# Patient Record
Sex: FEMALE | URBAN - METROPOLITAN AREA
[De-identification: names, ages, dates, MRNs, and addresses within clinical notes are randomized per-mention and may not be internally consistent; named-entity substitution may affect disease eponyms.]

---

## 2020-04-05 ENCOUNTER — INPATIENT (INPATIENT)
Facility: HOSPITAL | Age: 75
LOS: 7 days | Discharge: ANOTHER HOSPITAL | DRG: 177 | End: 2020-04-13
Payer: MEDICARE

## 2020-04-05 ENCOUNTER — TRANSCRIPTION ENCOUNTER (OUTPATIENT)
Age: 75
End: 2020-04-05

## 2020-04-05 VITALS
RESPIRATION RATE: 16 BRPM | HEIGHT: 62 IN | SYSTOLIC BLOOD PRESSURE: 120 MMHG | OXYGEN SATURATION: 72 % | TEMPERATURE: 98 F | HEART RATE: 79 BPM | DIASTOLIC BLOOD PRESSURE: 67 MMHG | WEIGHT: 117.95 LBS

## 2020-04-05 DIAGNOSIS — F41.9 ANXIETY DISORDER, UNSPECIFIED: ICD-10-CM

## 2020-04-05 DIAGNOSIS — J45.909 UNSPECIFIED ASTHMA, UNCOMPLICATED: ICD-10-CM

## 2020-04-05 DIAGNOSIS — I10 ESSENTIAL (PRIMARY) HYPERTENSION: ICD-10-CM

## 2020-04-05 DIAGNOSIS — Z29.9 ENCOUNTER FOR PROPHYLACTIC MEASURES, UNSPECIFIED: ICD-10-CM

## 2020-04-05 DIAGNOSIS — U07.1 COVID-19: ICD-10-CM

## 2020-04-05 DIAGNOSIS — E11.9 TYPE 2 DIABETES MELLITUS WITHOUT COMPLICATIONS: ICD-10-CM

## 2020-04-05 DIAGNOSIS — E78.00 PURE HYPERCHOLESTEROLEMIA, UNSPECIFIED: ICD-10-CM

## 2020-04-05 LAB
ALBUMIN SERPL ELPH-MCNC: 3.5 G/DL — SIGNIFICANT CHANGE UP (ref 3.3–5)
ALP SERPL-CCNC: 82 U/L — SIGNIFICANT CHANGE UP (ref 40–120)
ALT FLD-CCNC: 46 U/L — HIGH (ref 10–45)
ANION GAP SERPL CALC-SCNC: 16 MMOL/L — SIGNIFICANT CHANGE UP (ref 5–17)
APPEARANCE UR: CLEAR — SIGNIFICANT CHANGE UP
APTT BLD: 31.6 SEC — SIGNIFICANT CHANGE UP (ref 27.5–36.3)
AST SERPL-CCNC: 45 U/L — HIGH (ref 10–40)
BASE EXCESS BLDV CALC-SCNC: 1.2 MMOL/L — SIGNIFICANT CHANGE UP
BASOPHILS # BLD AUTO: 0.02 K/UL — SIGNIFICANT CHANGE UP (ref 0–0.2)
BASOPHILS NFR BLD AUTO: 0.1 % — SIGNIFICANT CHANGE UP (ref 0–2)
BILIRUB SERPL-MCNC: 0.7 MG/DL — SIGNIFICANT CHANGE UP (ref 0.2–1.2)
BILIRUB UR-MCNC: NEGATIVE — SIGNIFICANT CHANGE UP
BUN SERPL-MCNC: 15 MG/DL — SIGNIFICANT CHANGE UP (ref 7–23)
CALCIUM SERPL-MCNC: 9.1 MG/DL — SIGNIFICANT CHANGE UP (ref 8.4–10.5)
CHLORIDE SERPL-SCNC: 98 MMOL/L — SIGNIFICANT CHANGE UP (ref 96–108)
CK SERPL-CCNC: 38 U/L — SIGNIFICANT CHANGE UP (ref 25–170)
CO2 SERPL-SCNC: 22 MMOL/L — SIGNIFICANT CHANGE UP (ref 22–31)
COLOR SPEC: YELLOW — SIGNIFICANT CHANGE UP
CREAT SERPL-MCNC: 0.61 MG/DL — SIGNIFICANT CHANGE UP (ref 0.5–1.3)
CRP SERPL-MCNC: 11.19 MG/DL — HIGH (ref 0–0.4)
D DIMER BLD IA.RAPID-MCNC: 343 NG/ML DDU — HIGH
DIFF PNL FLD: NEGATIVE — SIGNIFICANT CHANGE UP
EOSINOPHIL # BLD AUTO: 0 K/UL — SIGNIFICANT CHANGE UP (ref 0–0.5)
EOSINOPHIL NFR BLD AUTO: 0 % — SIGNIFICANT CHANGE UP (ref 0–6)
ERYTHROCYTE [SEDIMENTATION RATE] IN BLOOD: 65 MM/HR — HIGH
FERRITIN SERPL-MCNC: 781 NG/ML — HIGH (ref 15–150)
GLUCOSE BLDC GLUCOMTR-MCNC: 118 MG/DL — HIGH (ref 70–99)
GLUCOSE SERPL-MCNC: 135 MG/DL — HIGH (ref 70–99)
GLUCOSE UR QL: NEGATIVE — SIGNIFICANT CHANGE UP
HCO3 BLDV-SCNC: 25 MMOL/L — SIGNIFICANT CHANGE UP (ref 20–27)
HCT VFR BLD CALC: 41.1 % — SIGNIFICANT CHANGE UP (ref 34.5–45)
HGB BLD-MCNC: 13.5 G/DL — SIGNIFICANT CHANGE UP (ref 11.5–15.5)
IMM GRANULOCYTES NFR BLD AUTO: 1.2 % — SIGNIFICANT CHANGE UP (ref 0–1.5)
INR BLD: 1.08 — SIGNIFICANT CHANGE UP (ref 0.88–1.16)
KETONES UR-MCNC: 15 MG/DL
LACTATE SERPL-SCNC: 1.8 MMOL/L — SIGNIFICANT CHANGE UP (ref 0.5–2)
LDH SERPL L TO P-CCNC: 516 U/L — HIGH (ref 50–242)
LEUKOCYTE ESTERASE UR-ACNC: ABNORMAL
LYMPHOCYTES # BLD AUTO: 0.76 K/UL — LOW (ref 1–3.3)
LYMPHOCYTES # BLD AUTO: 5.3 % — LOW (ref 13–44)
MCHC RBC-ENTMCNC: 28.1 PG — SIGNIFICANT CHANGE UP (ref 27–34)
MCHC RBC-ENTMCNC: 32.8 GM/DL — SIGNIFICANT CHANGE UP (ref 32–36)
MCV RBC AUTO: 85.6 FL — SIGNIFICANT CHANGE UP (ref 80–100)
MONOCYTES # BLD AUTO: 0.56 K/UL — SIGNIFICANT CHANGE UP (ref 0–0.9)
MONOCYTES NFR BLD AUTO: 3.9 % — SIGNIFICANT CHANGE UP (ref 2–14)
NEUTROPHILS # BLD AUTO: 12.79 K/UL — HIGH (ref 1.8–7.4)
NEUTROPHILS NFR BLD AUTO: 89.5 % — HIGH (ref 43–77)
NITRITE UR-MCNC: NEGATIVE — SIGNIFICANT CHANGE UP
NRBC # BLD: 0 /100 WBCS — SIGNIFICANT CHANGE UP (ref 0–0)
PCO2 BLDV: 38 MMHG — LOW (ref 41–51)
PH BLDV: 7.44 — HIGH (ref 7.32–7.43)
PH UR: 6 — SIGNIFICANT CHANGE UP (ref 5–8)
PLATELET # BLD AUTO: 380 K/UL — SIGNIFICANT CHANGE UP (ref 150–400)
PO2 BLDV: 60 MMHG — SIGNIFICANT CHANGE UP
POTASSIUM SERPL-MCNC: 4.2 MMOL/L — SIGNIFICANT CHANGE UP (ref 3.5–5.3)
POTASSIUM SERPL-SCNC: 4.2 MMOL/L — SIGNIFICANT CHANGE UP (ref 3.5–5.3)
PROT SERPL-MCNC: 7.7 G/DL — SIGNIFICANT CHANGE UP (ref 6–8.3)
PROT UR-MCNC: 30 MG/DL
PROTHROM AB SERPL-ACNC: 12.4 SEC — SIGNIFICANT CHANGE UP (ref 10–12.9)
RBC # BLD: 4.8 M/UL — SIGNIFICANT CHANGE UP (ref 3.8–5.2)
RBC # FLD: 13.3 % — SIGNIFICANT CHANGE UP (ref 10.3–14.5)
SAO2 % BLDV: 91 % — SIGNIFICANT CHANGE UP
SARS-COV-2 RNA SPEC QL NAA+PROBE: DETECTED
SODIUM SERPL-SCNC: 136 MMOL/L — SIGNIFICANT CHANGE UP (ref 135–145)
SP GR SPEC: 1.02 — SIGNIFICANT CHANGE UP (ref 1–1.03)
TROPONIN T SERPL-MCNC: <0.01 NG/ML — SIGNIFICANT CHANGE UP (ref 0–0.01)
UROBILINOGEN FLD QL: 2 E.U./DL
WBC # BLD: 14.3 K/UL — HIGH (ref 3.8–10.5)
WBC # FLD AUTO: 14.3 K/UL — HIGH (ref 3.8–10.5)

## 2020-04-05 PROCEDURE — 99223 1ST HOSP IP/OBS HIGH 75: CPT | Mod: GC

## 2020-04-05 PROCEDURE — 71045 X-RAY EXAM CHEST 1 VIEW: CPT | Mod: 26

## 2020-04-05 PROCEDURE — 99285 EMERGENCY DEPT VISIT HI MDM: CPT

## 2020-04-05 PROCEDURE — 93010 ELECTROCARDIOGRAM REPORT: CPT

## 2020-04-05 RX ORDER — DEXTROSE 50 % IN WATER 50 %
15 SYRINGE (ML) INTRAVENOUS ONCE
Refills: 0 | Status: DISCONTINUED | OUTPATIENT
Start: 2020-04-05 | End: 2020-04-13

## 2020-04-05 RX ORDER — SERTRALINE 25 MG/1
25 TABLET, FILM COATED ORAL DAILY
Refills: 0 | Status: DISCONTINUED | OUTPATIENT
Start: 2020-04-05 | End: 2020-04-13

## 2020-04-05 RX ORDER — ALBUTEROL 90 UG/1
2 AEROSOL, METERED ORAL EVERY 6 HOURS
Refills: 0 | Status: DISCONTINUED | OUTPATIENT
Start: 2020-04-05 | End: 2020-04-13

## 2020-04-05 RX ORDER — AZITHROMYCIN 500 MG/1
250 TABLET, FILM COATED ORAL EVERY 24 HOURS
Refills: 0 | Status: COMPLETED | OUTPATIENT
Start: 2020-04-06 | End: 2020-04-09

## 2020-04-05 RX ORDER — DEXTROSE 50 % IN WATER 50 %
12.5 SYRINGE (ML) INTRAVENOUS ONCE
Refills: 0 | Status: DISCONTINUED | OUTPATIENT
Start: 2020-04-05 | End: 2020-04-13

## 2020-04-05 RX ORDER — ENOXAPARIN SODIUM 100 MG/ML
40 INJECTION SUBCUTANEOUS EVERY 24 HOURS
Refills: 0 | Status: DISCONTINUED | OUTPATIENT
Start: 2020-04-05 | End: 2020-04-13

## 2020-04-05 RX ORDER — HYDROXYCHLOROQUINE SULFATE 200 MG
400 TABLET ORAL EVERY 12 HOURS
Refills: 0 | Status: COMPLETED | OUTPATIENT
Start: 2020-04-05 | End: 2020-04-06

## 2020-04-05 RX ORDER — DEXTROSE 50 % IN WATER 50 %
25 SYRINGE (ML) INTRAVENOUS ONCE
Refills: 0 | Status: DISCONTINUED | OUTPATIENT
Start: 2020-04-05 | End: 2020-04-13

## 2020-04-05 RX ORDER — ALPRAZOLAM 0.25 MG
0.5 TABLET ORAL AT BEDTIME
Refills: 0 | Status: DISCONTINUED | OUTPATIENT
Start: 2020-04-05 | End: 2020-04-09

## 2020-04-05 RX ORDER — HYDROXYCHLOROQUINE SULFATE 200 MG
TABLET ORAL
Refills: 0 | Status: COMPLETED | OUTPATIENT
Start: 2020-04-05 | End: 2020-04-10

## 2020-04-05 RX ORDER — SODIUM CHLORIDE 9 MG/ML
1000 INJECTION, SOLUTION INTRAVENOUS
Refills: 0 | Status: DISCONTINUED | OUTPATIENT
Start: 2020-04-05 | End: 2020-04-13

## 2020-04-05 RX ORDER — ACETAMINOPHEN 500 MG
650 TABLET ORAL ONCE
Refills: 0 | Status: COMPLETED | OUTPATIENT
Start: 2020-04-05 | End: 2020-04-05

## 2020-04-05 RX ORDER — HYDROXYCHLOROQUINE SULFATE 200 MG
200 TABLET ORAL EVERY 12 HOURS
Refills: 0 | Status: COMPLETED | OUTPATIENT
Start: 2020-04-06 | End: 2020-04-10

## 2020-04-05 RX ORDER — GLUCAGON INJECTION, SOLUTION 0.5 MG/.1ML
1 INJECTION, SOLUTION SUBCUTANEOUS ONCE
Refills: 0 | Status: DISCONTINUED | OUTPATIENT
Start: 2020-04-05 | End: 2020-04-13

## 2020-04-05 RX ORDER — FAMOTIDINE 10 MG/ML
20 INJECTION INTRAVENOUS DAILY
Refills: 0 | Status: DISCONTINUED | OUTPATIENT
Start: 2020-04-05 | End: 2020-04-13

## 2020-04-05 RX ORDER — AZITHROMYCIN 500 MG/1
500 TABLET, FILM COATED ORAL ONCE
Refills: 0 | Status: COMPLETED | OUTPATIENT
Start: 2020-04-05 | End: 2020-04-05

## 2020-04-05 RX ORDER — INSULIN LISPRO 100/ML
VIAL (ML) SUBCUTANEOUS
Refills: 0 | Status: DISCONTINUED | OUTPATIENT
Start: 2020-04-05 | End: 2020-04-13

## 2020-04-05 RX ORDER — LANOLIN ALCOHOL/MO/W.PET/CERES
3 CREAM (GRAM) TOPICAL AT BEDTIME
Refills: 0 | Status: DISCONTINUED | OUTPATIENT
Start: 2020-04-05 | End: 2020-04-13

## 2020-04-05 RX ORDER — BUDESONIDE AND FORMOTEROL FUMARATE DIHYDRATE 160; 4.5 UG/1; UG/1
2 AEROSOL RESPIRATORY (INHALATION)
Refills: 0 | Status: DISCONTINUED | OUTPATIENT
Start: 2020-04-05 | End: 2020-04-13

## 2020-04-05 RX ORDER — ACETAMINOPHEN 500 MG
650 TABLET ORAL EVERY 6 HOURS
Refills: 0 | Status: DISCONTINUED | OUTPATIENT
Start: 2020-04-05 | End: 2020-04-13

## 2020-04-05 RX ORDER — GUAIFENESIN/DEXTROMETHORPHAN 600MG-30MG
5 TABLET, EXTENDED RELEASE 12 HR ORAL EVERY 8 HOURS
Refills: 0 | Status: DISCONTINUED | OUTPATIENT
Start: 2020-04-05 | End: 2020-04-13

## 2020-04-05 RX ADMIN — Medication 0.5 MILLIGRAM(S): at 21:18

## 2020-04-05 RX ADMIN — AZITHROMYCIN 255 MILLIGRAM(S): 500 TABLET, FILM COATED ORAL at 15:17

## 2020-04-05 RX ADMIN — ENOXAPARIN SODIUM 40 MILLIGRAM(S): 100 INJECTION SUBCUTANEOUS at 21:18

## 2020-04-05 RX ADMIN — FAMOTIDINE 20 MILLIGRAM(S): 10 INJECTION INTRAVENOUS at 21:18

## 2020-04-05 RX ADMIN — Medication 400 MILLIGRAM(S): at 18:28

## 2020-04-05 RX ADMIN — Medication 650 MILLIGRAM(S): at 13:45

## 2020-04-05 NOTE — ED PROVIDER NOTE - PROGRESS NOTE DETAILS
Klepfish: Well appearing, resting in bed, appears comfortable. satting 95% on 4.5L NC. Klepfish: wbc 14, elevated inflammatory markers, other labs grossly wnl. CXR b/l infiltrates. pt stable on NC, comfortable appearing. satting mid to high 90s on 4.5L NC. Clinically does not require higher level of care at this time. will admit for further care.

## 2020-04-05 NOTE — H&P ADULT - ASSESSMENT
74F former smoker PMH asthma (never intubated), HTN, HLD, DM p/w SOB. Pt has 5d of cough, fatigue, f/c, decreased appetite, worsening SOB. Went to UC who referred to ED for hypoxia. No known sick contacts. Denies HA, CP, NVD, abd pain, urinary complaints, recent travel, rashes, black/bloody stool. 74 F former smoker, PMH asthma (never intubated, ran out of albuterol), HTN, HLD, DM p/w 5 days SOB/ cough, admitted for hypoxia 2/2 covid.

## 2020-04-05 NOTE — H&P ADULT - PROBLEM SELECTOR PLAN 2
never intubated, mild intermittent but also on montelukast  -albuterol q6 standing  -symbicort  bid  -steroids if worsens  -consider restarting monteluklast

## 2020-04-05 NOTE — ED PROVIDER NOTE - OBJECTIVE STATEMENT
74F former smoker PMH asthma (never intubated), HTN, HLD, DM p/w SOB. Pt has 5d of cough, fatigue, f/c, decreased appetite, worsening SOB. Went to UC who referred to ED for hypoxia. No known sick contacts. Denies HA, CP, NVD, abd pain, urinary complaints, recent travel, rashes, black/bloody stool.

## 2020-04-05 NOTE — ED PROVIDER NOTE - CLINICAL SUMMARY MEDICAL DECISION MAKING FREE TEXT BOX
74F PMH asthma (never intubated), HTN, HLD, DM p/w SOB. Pt has 5d of cough, fatigue, f/c, decreased appetite, worsening SOB. Went to  who referred to ED for hypoxia. No other systemic symptoms. Hypoxic w/ good waveform to 75RA at rest, mild tachypnea, other vitals wnl. Exam as above.  ddx: Likely covid.   covid labs w/u, CXR, tylenol.  Placed on NC, satting 91% on 4L.

## 2020-04-05 NOTE — H&P ADULT - NSHPPHYSICALEXAM_GEN_ALL_CORE
.  VITAL SIGNS:  T(C): 36.6 (04-05-20 @ 13:31), Max: 36.6 (04-05-20 @ 13:31)  T(F): 97.8 (04-05-20 @ 13:31), Max: 97.8 (04-05-20 @ 13:31)  HR: 79 (04-05-20 @ 13:31) (79 - 79)  BP: 120/67 (04-05-20 @ 13:31) (120/67 - 120/67)  BP(mean): --  RR: 16 (04-05-20 @ 13:31) (16 - 16)  SpO2: 95% (04-05-20 @ 14:32) (72% - 95%)  Wt(kg): --    PHYSICAL EXAM:    Constitutional: WDWN resting comfortably in bed; NAD  Head: NC/AT  Eyes: PERRL, EOMI, clear conjunctiva  ENT: no nasal discharge; uvula midline, no oropharyngeal erythema or exudates; MMM  Neck: supple; no JVD or thyromegaly  Respiratory: CTA B/L; no W/R/R, no retractions  Cardiac: +S1/S2; RRR; no M/R/G;  Gastrointestinal: soft, NT/ND; no rebound or guarding; +BSx4  Extremities: WWP, no clubbing or cyanosis; no peripheral edema  Musculoskeletal: NROM x4; no joint swelling, tenderness or erythema  Vascular: 2+ radial, femoral, DP/PT pulses B/L  Dermatologic: skin warm, dry and intact; no rashes, wounds, or scars  Neurologic: AAOx3; CNII-XII grossly intact; no focal deficits  Psychiatric: affect and characteristics of appearance, verbalizations, behaviors are appropriate .  VITAL SIGNS:  T(C): 36.6 (04-05-20 @ 13:31), Max: 36.6 (04-05-20 @ 13:31)  T(F): 97.8 (04-05-20 @ 13:31), Max: 97.8 (04-05-20 @ 13:31)  HR: 79 (04-05-20 @ 13:31) (79 - 79)  BP: 120/67 (04-05-20 @ 13:31) (120/67 - 120/67)  BP(mean): --  RR: 16 (04-05-20 @ 13:31) (16 - 16)  SpO2: 95% (04-05-20 @ 14:32) (72% - 95%)  Wt(kg): --    PHYSICAL EXAM:    Constitutional: WDWN resting comfortably in bed; NAD  Head: NC/AT  Eyes: EOMI  ENT: on o2 nc  Respiratory: breathing comfortably on NC, no tachypnea, speaking full sentences  Gastrointestinal: soft, NT/ND  Extremities: WWP, no clubbing or cyanosis; no peripheral edema  Dermatologic: skin warm, dry and intact; no rashes, wounds, or scars  Neurologic: AAOx3;  Psychiatric: affect and characteristics of appearance, verbalizations, behaviors are appropriate

## 2020-04-05 NOTE — H&P ADULT - NSHPLABSRESULTS_GEN_ALL_CORE
.  LABS:                         13.5   14.30 )-----------( 380      ( 2020 14:23 )             41.1         136  |  98  |  15  ----------------------------<  135<H>  4.2   |  22  |  0.61    Ca    9.1      2020 14:23    TPro  7.7  /  Alb  3.5  /  TBili  0.7  /  DBili  x   /  AST  45<H>  /  ALT  46<H>  /  AlkPhos  82      PT/INR - ( 2020 14:23 )   PT: 12.4 sec;   INR: 1.08          PTT - ( 2020 14:23 )  PTT:31.6 sec  Urinalysis Basic - ( 2020 15:52 )    Color: Yellow / Appearance: Clear / S.020 / pH: x  Gluc: x / Ketone: 15 mg/dL  / Bili: Negative / Urobili: 2.0 E.U./dL   Blood: x / Protein: 30 mg/dL / Nitrite: NEGATIVE   Leuk Esterase: Moderate / RBC: < 5 /HPF / WBC 5-10 /HPF   Sq Epi: x / Non Sq Epi: 0-5 /HPF / Bacteria: Present /HPF      CARDIAC MARKERS ( 2020 14:23 )  x     / <0.01 ng/mL / 38 U/L / x     / x            Lactate, Blood: 1.8 mmol/L ( @ 14:23)      RADIOLOGY, EKG & ADDITIONAL TESTS: Reviewed.

## 2020-04-05 NOTE — H&P ADULT - PROBLEM SELECTOR PLAN 7
F: PO hydration  E: replete PRN  N: dash/ CC diet  DVT ppx: lovenox  other ppx famotidine  FULL CODE  dispo: RMF  Ambulate as tolerated

## 2020-04-05 NOTE — ED ADULT TRIAGE NOTE - CHIEF COMPLAINT QUOTE
Pt directed to ED from  [facility] SO[facility].  Daughter states "He Pulse Ox was low and they said she should come here."  Mask applied.

## 2020-04-05 NOTE — H&P ADULT - HISTORY OF PRESENT ILLNESS
74 F former smoker, PMH asthma (never intubated, ran out of albuterol), HTN, HLD, DM p/w 5 days of SOB. Pt w/ 5 days of SOB, cough, improved w/ albuterol but ran out of albuterol day before admission. No fevers, endorses chills, no nv/d. No sick contacts but daughter is nurse and pt went to [facility] in jan. Last asthma attack in dec, never got steroids or ER visit    HPI:    Date of onset of fever: n/a  Date of onset of dyspnea: 5 days ago  Recent Travel: [facility] january  Sick Contacts: no  COVID Exposure: no  Close Contacts: no    ROS:  Fevers: N  Malaise: N  Myalgias: N  SOB: Y        At rest: Y        With exertion: Y         At baseline: Y  Cough: Y        Productive:   Nausea: N  Vomiting:N  Diarrhea: N    PMHx:   HTN: Y  DM: Y  Lung Disease: N  Cardiovascular disease: N  Malignancy: N  Immunosuppression: N  HIV: N  CKD/ESRD: N  Chronic Liver Disease: N    SoHx:  Tobacco use:  former   Vape use: N  Health care worker: N- daughter is nurse 74 F former smoker, PMH asthma (never intubated, ran out of albuterol), HTN, HLD, DM p/w 5 days of SOB. Pt w/ 5 days of SOB, cough, improved w/ albuterol but ran out of albuterol day before admission. No fevers, endorses chills, no nv/d. No sick contacts but daughter is nurse and pt went to [facility] in jan. Last asthma attack in dec, never got steroids or ER visit, just montelukast.     HPI:    Date of onset of fever: n/a  Date of onset of dyspnea: 5 days ago  Recent Travel: [facility] january  Sick Contacts: no  COVID Exposure: no  Close Contacts: no    ROS:  Fevers: N  Malaise: N  Myalgias: N  SOB: Y        At rest: Y        With exertion: Y         At baseline: Y  Cough: Y        Productive:   Nausea: N  Vomiting:N  Diarrhea: N    PMHx:   HTN: Y  DM: Y  Lung Disease: N  Cardiovascular disease: N  Malignancy: N  Immunosuppression: N  HIV: N  CKD/ESRD: N  Chronic Liver Disease: N    SoHx:  Tobacco use:  former   Vape use: N  Health care worker: N- daughter is nurse

## 2020-04-05 NOTE — H&P ADULT - PROBLEM SELECTOR PLAN 1
covid +, hypoxia requiring 4 L NC  -isolation precuations  -tylenol q6 prn  -azithro 5 d 250  -plaquenil 5 days  -cough meds prn  -oxygen  -if worsens will consider steroids  -monitor wbc, fu bcx- consider ctx if worsens/ wbc remains high for superimprosed pna

## 2020-04-06 DIAGNOSIS — R10.13 EPIGASTRIC PAIN: ICD-10-CM

## 2020-04-06 LAB
ALBUMIN SERPL ELPH-MCNC: 3 G/DL — LOW (ref 3.3–5)
ALP SERPL-CCNC: 78 U/L — SIGNIFICANT CHANGE UP (ref 40–120)
ALT FLD-CCNC: 42 U/L — SIGNIFICANT CHANGE UP (ref 10–45)
ANION GAP SERPL CALC-SCNC: 17 MMOL/L — SIGNIFICANT CHANGE UP (ref 5–17)
AST SERPL-CCNC: 44 U/L — HIGH (ref 10–40)
BASOPHILS # BLD AUTO: 0.02 K/UL — SIGNIFICANT CHANGE UP (ref 0–0.2)
BASOPHILS NFR BLD AUTO: 0.2 % — SIGNIFICANT CHANGE UP (ref 0–2)
BILIRUB SERPL-MCNC: 0.8 MG/DL — SIGNIFICANT CHANGE UP (ref 0.2–1.2)
BUN SERPL-MCNC: 13 MG/DL — SIGNIFICANT CHANGE UP (ref 7–23)
CALCIUM SERPL-MCNC: 8.8 MG/DL — SIGNIFICANT CHANGE UP (ref 8.4–10.5)
CHLORIDE SERPL-SCNC: 99 MMOL/L — SIGNIFICANT CHANGE UP (ref 96–108)
CO2 SERPL-SCNC: 23 MMOL/L — SIGNIFICANT CHANGE UP (ref 22–31)
CREAT SERPL-MCNC: 0.6 MG/DL — SIGNIFICANT CHANGE UP (ref 0.5–1.3)
CULTURE RESULTS: SIGNIFICANT CHANGE UP
D DIMER BLD IA.RAPID-MCNC: 297 NG/ML DDU — HIGH
EOSINOPHIL # BLD AUTO: 0.01 K/UL — SIGNIFICANT CHANGE UP (ref 0–0.5)
EOSINOPHIL NFR BLD AUTO: 0.1 % — SIGNIFICANT CHANGE UP (ref 0–6)
FERRITIN SERPL-MCNC: 744 NG/ML — HIGH (ref 15–150)
GLUCOSE BLDC GLUCOMTR-MCNC: 130 MG/DL — HIGH (ref 70–99)
GLUCOSE BLDC GLUCOMTR-MCNC: 132 MG/DL — HIGH (ref 70–99)
GLUCOSE BLDC GLUCOMTR-MCNC: 146 MG/DL — HIGH (ref 70–99)
GLUCOSE BLDC GLUCOMTR-MCNC: 174 MG/DL — HIGH (ref 70–99)
GLUCOSE SERPL-MCNC: 189 MG/DL — HIGH (ref 70–99)
HBA1C BLD-MCNC: 7.1 % — HIGH (ref 4–5.6)
HCT VFR BLD CALC: 41.4 % — SIGNIFICANT CHANGE UP (ref 34.5–45)
HCV AB S/CO SERPL IA: 0.13 S/CO — SIGNIFICANT CHANGE UP
HCV AB SERPL-IMP: SIGNIFICANT CHANGE UP
HGB BLD-MCNC: 13.2 G/DL — SIGNIFICANT CHANGE UP (ref 11.5–15.5)
IMM GRANULOCYTES NFR BLD AUTO: 0.6 % — SIGNIFICANT CHANGE UP (ref 0–1.5)
LYMPHOCYTES # BLD AUTO: 0.79 K/UL — LOW (ref 1–3.3)
LYMPHOCYTES # BLD AUTO: 6.1 % — LOW (ref 13–44)
MAGNESIUM SERPL-MCNC: 2.3 MG/DL — SIGNIFICANT CHANGE UP (ref 1.6–2.6)
MCHC RBC-ENTMCNC: 27.8 PG — SIGNIFICANT CHANGE UP (ref 27–34)
MCHC RBC-ENTMCNC: 31.9 GM/DL — LOW (ref 32–36)
MCV RBC AUTO: 87.3 FL — SIGNIFICANT CHANGE UP (ref 80–100)
MONOCYTES # BLD AUTO: 0.41 K/UL — SIGNIFICANT CHANGE UP (ref 0–0.9)
MONOCYTES NFR BLD AUTO: 3.2 % — SIGNIFICANT CHANGE UP (ref 2–14)
NEUTROPHILS # BLD AUTO: 11.57 K/UL — HIGH (ref 1.8–7.4)
NEUTROPHILS NFR BLD AUTO: 89.8 % — HIGH (ref 43–77)
NRBC # BLD: 0 /100 WBCS — SIGNIFICANT CHANGE UP (ref 0–0)
PHOSPHATE SERPL-MCNC: 3.3 MG/DL — SIGNIFICANT CHANGE UP (ref 2.5–4.5)
PLATELET # BLD AUTO: 399 K/UL — SIGNIFICANT CHANGE UP (ref 150–400)
POTASSIUM SERPL-MCNC: 3.9 MMOL/L — SIGNIFICANT CHANGE UP (ref 3.5–5.3)
POTASSIUM SERPL-SCNC: 3.9 MMOL/L — SIGNIFICANT CHANGE UP (ref 3.5–5.3)
PROT SERPL-MCNC: 6.8 G/DL — SIGNIFICANT CHANGE UP (ref 6–8.3)
RBC # BLD: 4.74 M/UL — SIGNIFICANT CHANGE UP (ref 3.8–5.2)
RBC # FLD: 13.3 % — SIGNIFICANT CHANGE UP (ref 10.3–14.5)
SODIUM SERPL-SCNC: 139 MMOL/L — SIGNIFICANT CHANGE UP (ref 135–145)
SPECIMEN SOURCE: SIGNIFICANT CHANGE UP
WBC # BLD: 12.88 K/UL — HIGH (ref 3.8–10.5)
WBC # FLD AUTO: 12.88 K/UL — HIGH (ref 3.8–10.5)

## 2020-04-06 RX ADMIN — Medication 3 MILLIGRAM(S): at 21:50

## 2020-04-06 RX ADMIN — ALBUTEROL 2 PUFF(S): 90 AEROSOL, METERED ORAL at 06:02

## 2020-04-06 RX ADMIN — Medication 650 MILLIGRAM(S): at 15:51

## 2020-04-06 RX ADMIN — Medication 650 MILLIGRAM(S): at 06:03

## 2020-04-06 RX ADMIN — SERTRALINE 25 MILLIGRAM(S): 25 TABLET, FILM COATED ORAL at 10:30

## 2020-04-06 RX ADMIN — ALBUTEROL 2 PUFF(S): 90 AEROSOL, METERED ORAL at 00:14

## 2020-04-06 RX ADMIN — AZITHROMYCIN 250 MILLIGRAM(S): 500 TABLET, FILM COATED ORAL at 14:14

## 2020-04-06 RX ADMIN — ALBUTEROL 2 PUFF(S): 90 AEROSOL, METERED ORAL at 15:53

## 2020-04-06 RX ADMIN — Medication 200 MILLIGRAM(S): at 17:37

## 2020-04-06 RX ADMIN — BUDESONIDE AND FORMOTEROL FUMARATE DIHYDRATE 2 PUFF(S): 160; 4.5 AEROSOL RESPIRATORY (INHALATION) at 10:07

## 2020-04-06 RX ADMIN — Medication 3 MILLIGRAM(S): at 00:15

## 2020-04-06 RX ADMIN — BUDESONIDE AND FORMOTEROL FUMARATE DIHYDRATE 2 PUFF(S): 160; 4.5 AEROSOL RESPIRATORY (INHALATION) at 00:14

## 2020-04-06 RX ADMIN — Medication 30 MILLILITER(S): at 14:14

## 2020-04-06 RX ADMIN — ENOXAPARIN SODIUM 40 MILLIGRAM(S): 100 INJECTION SUBCUTANEOUS at 21:50

## 2020-04-06 RX ADMIN — FAMOTIDINE 20 MILLIGRAM(S): 10 INJECTION INTRAVENOUS at 10:30

## 2020-04-06 RX ADMIN — BUDESONIDE AND FORMOTEROL FUMARATE DIHYDRATE 2 PUFF(S): 160; 4.5 AEROSOL RESPIRATORY (INHALATION) at 21:52

## 2020-04-06 RX ADMIN — ALBUTEROL 2 PUFF(S): 90 AEROSOL, METERED ORAL at 21:52

## 2020-04-06 RX ADMIN — Medication 1: at 12:00

## 2020-04-06 RX ADMIN — ALBUTEROL 2 PUFF(S): 90 AEROSOL, METERED ORAL at 10:07

## 2020-04-06 RX ADMIN — Medication 400 MILLIGRAM(S): at 05:31

## 2020-04-06 NOTE — PROVIDER CONTACT NOTE (OTHER) - ACTION/TREATMENT ORDERED:
FS = 174, dizziness vital signs stable, during reassessment pt verbalized dizziness resolved, epigastric discomfort resolved with ordered pepcid,  daughter at bedside,

## 2020-04-06 NOTE — PROGRESS NOTE ADULT - PROBLEM SELECTOR PLAN 8
-Continue famotidine 20mg PO daily  -PRN Maalox   -Encourage sitting up on the edge of the bed for meals

## 2020-04-06 NOTE — PROVIDER CONTACT NOTE (OTHER) - BACKGROUND
asthma (never intubated, ran out of albuterol), HTN, HLD, DM  p/w 5 days SOB/ cough  admitted for hypoxia 2/2 covid.

## 2020-04-06 NOTE — PROGRESS NOTE ADULT - PROBLEM SELECTOR PLAN 1
covid +, hypoxia requiring 4 L NC  -isolation precuations  -tylenol q6 prn  -azithro 5 d 250  -plaquenil 5 days  -cough meds prn  -oxygen  -if worsens will consider steroids  -monitor wbc, fu bcx- consider ctx if worsens/ wbc remains high for superimprosed pna covid +, hypoxia requiring 10L NRB  -isolation precautions  -tylenol q6 prn  -azithro 5 d 250 (started 4/5)  -plaquenil 5 days (started 4/5)  -Monitor QTC (4/6/2020 )  -cough meds prn  -Oxygen, currently 93% on NRB 10L, titrate down as tolerated   -If worsens will consider steroids  -monitor wbc, fu bcx- consider ctx if worsens/ wbc remains high for superimprosed pna

## 2020-04-06 NOTE — PROGRESS NOTE ADULT - ASSESSMENT
74 F former smoker, PMH asthma (never intubated, ran out of albuterol), HTN, HLD, DM p/w 5 days SOB/ cough, admitted for hypoxia 2/2 COVID (+). 74 F former smoker, PMH asthma (never intubated, ran out of albuterol), HTN, HLD, DM p/w 5 days SOB/ cough, admitted for hypoxia 2/2 COVID (+). She is currently 93% on 10L NRB.

## 2020-04-06 NOTE — PROGRESS NOTE ADULT - SUBJECTIVE AND OBJECTIVE BOX
INTERVAL HPI/OVERNIGHT EVENTS: none     Patient was seen and examined at bedside. As per nurse and patient, no o/n events, patient resting comfortably. Patient c/o intermittent SOB and dyspepsia. Patient denies: fever, chills, HA, Changes in vision, CP, palpitations, cough, N/V/D/C, dysuria, changes in bowel movements, LE edema. ROS otherwise negative.    VITAL SIGNS:  T(F): 99.3 (20 @ 14:45)  HR: 59 (20 @ 11:35)  BP: 108/54 (20 @ 13:30)  RR: 20 (20 @ 13:30)  SpO2: 88% (20 @ 12:05)  Wt(kg): --    PHYSICAL EXAM:    Constitutional: WDWN, NAD  HEENT:  EOMI, sclera non-icteric, neck supple, trachea midline, no masses, no JVD, MMM  Respiratory: CTA b/l, diminished at bases b/l, no wheezing, no rhonchi, no rales, without accessory muscle use and no intercostal retractions  Cardiovascular: RRR, normal S1S2, no M/R/G  Gastrointestinal: soft, NTND, no masses palpable, BS normal  Extremities: Warm, well perfused, pulses equal bilateral upper and lower extremities, no edema, no clubbing  Neurological: AAOx3, CN Grossly intact  Skin: Normal temperature, warm, dry    MEDICATIONS  (STANDING):  ALBUTerol    90 MICROgram(s) HFA Inhaler 2 Puff(s) Inhalation every 6 hours  azithromycin   Tablet 250 milliGRAM(s) Oral every 24 hours  budesonide 160 MICROgram(s)/formoterol 4.5 MICROgram(s) Inhaler 2 Puff(s) Inhalation two times a day  dextrose 5%. 1000 milliLiter(s) (50 mL/Hr) IV Continuous <Continuous>  dextrose 50% Injectable 12.5 Gram(s) IV Push once  dextrose 50% Injectable 25 Gram(s) IV Push once  dextrose 50% Injectable 25 Gram(s) IV Push once  enoxaparin Injectable 40 milliGRAM(s) SubCutaneous every 24 hours  famotidine    Tablet 20 milliGRAM(s) Oral daily  hydroxychloroquine   Oral   hydroxychloroquine 200 milliGRAM(s) Oral every 12 hours  insulin lispro (HumaLOG) corrective regimen sliding scale   SubCutaneous Before meals and at bedtime  melatonin 3 milliGRAM(s) Oral at bedtime  sertraline 25 milliGRAM(s) Oral daily    MEDICATIONS  (PRN):  acetaminophen   Tablet .. 650 milliGRAM(s) Oral every 6 hours PRN Temp greater or equal to 38C (100.4F), Mild Pain (1 - 3)  ALPRAZolam 0.5 milliGRAM(s) Oral at bedtime PRN anxiety  aluminum hydroxide/magnesium hydroxide/simethicone Suspension 30 milliLiter(s) Oral every 6 hours PRN Dyspepsia  dextrose 40% Gel 15 Gram(s) Oral once PRN Blood Glucose LESS THAN 70 milliGRAM(s)/deciliter  glucagon  Injectable 1 milliGRAM(s) IntraMuscular once PRN Glucose LESS THAN 70 milligrams/deciliter  guaifenesin/dextromethorphan  Syrup 5 milliLiter(s) Oral every 8 hours PRN Cough      Allergies    No Known Allergies    Intolerances        LABS:                        13.2   12.88 )-----------( 399      ( 2020 10:04 )             41.4     04-06    139  |  99  |  13  ----------------------------<  189<H>  3.9   |  23  |  0.60    Ca    8.8      2020 10:04  Phos  3.3     04-06  Mg     2.3     04-06    TPro  6.8  /  Alb  3.0<L>  /  TBili  0.8  /  DBili  x   /  AST  44<H>  /  ALT  42  /  AlkPhos  78  04-06    PT/INR - ( 2020 14:23 )   PT: 12.4 sec;   INR: 1.08          PTT - ( 2020 14:23 )  PTT:31.6 sec  Urinalysis Basic - ( 2020 15:52 )    Color: Yellow / Appearance: Clear / S.020 / pH: x  Gluc: x / Ketone: 15 mg/dL  / Bili: Negative / Urobili: 2.0 E.U./dL   Blood: x / Protein: 30 mg/dL / Nitrite: NEGATIVE   Leuk Esterase: Moderate / RBC: < 5 /HPF / WBC 5-10 /HPF   Sq Epi: x / Non Sq Epi: 0-5 /HPF / Bacteria: Present /HPF        RADIOLOGY & ADDITIONAL TESTS:  Reviewed

## 2020-04-06 NOTE — PROGRESS NOTE ADULT - PROBLEM SELECTOR PLAN 2
never intubated, mild intermittent but also on montelukast  -albuterol q6 standing  -symbicort  bid  -steroids if worsens  -consider restarting monteluklast -never intubated, mild intermittent but also on montelukast  -albuterol q6 standing  -Symbicort  bid  -steroids if worsens  -consider restarting monteluklast

## 2020-04-07 LAB
ALBUMIN SERPL ELPH-MCNC: 3.1 G/DL — LOW (ref 3.3–5)
ALP SERPL-CCNC: 106 U/L — SIGNIFICANT CHANGE UP (ref 40–120)
ALT FLD-CCNC: 45 U/L — SIGNIFICANT CHANGE UP (ref 10–45)
ANION GAP SERPL CALC-SCNC: 15 MMOL/L — SIGNIFICANT CHANGE UP (ref 5–17)
AST SERPL-CCNC: 45 U/L — HIGH (ref 10–40)
BASOPHILS # BLD AUTO: 0.02 K/UL — SIGNIFICANT CHANGE UP (ref 0–0.2)
BASOPHILS NFR BLD AUTO: 0.1 % — SIGNIFICANT CHANGE UP (ref 0–2)
BILIRUB SERPL-MCNC: 0.9 MG/DL — SIGNIFICANT CHANGE UP (ref 0.2–1.2)
BUN SERPL-MCNC: 13 MG/DL — SIGNIFICANT CHANGE UP (ref 7–23)
CALCIUM SERPL-MCNC: 9.2 MG/DL — SIGNIFICANT CHANGE UP (ref 8.4–10.5)
CHLORIDE SERPL-SCNC: 98 MMOL/L — SIGNIFICANT CHANGE UP (ref 96–108)
CO2 SERPL-SCNC: 26 MMOL/L — SIGNIFICANT CHANGE UP (ref 22–31)
CREAT SERPL-MCNC: 0.51 MG/DL — SIGNIFICANT CHANGE UP (ref 0.5–1.3)
CRP SERPL-MCNC: 20.71 MG/DL — HIGH (ref 0–0.4)
D DIMER BLD IA.RAPID-MCNC: 403 NG/ML DDU — HIGH
EOSINOPHIL # BLD AUTO: 0.02 K/UL — SIGNIFICANT CHANGE UP (ref 0–0.5)
EOSINOPHIL NFR BLD AUTO: 0.1 % — SIGNIFICANT CHANGE UP (ref 0–6)
FERRITIN SERPL-MCNC: 798 NG/ML — HIGH (ref 15–150)
GLUCOSE BLDC GLUCOMTR-MCNC: 120 MG/DL — HIGH (ref 70–99)
GLUCOSE BLDC GLUCOMTR-MCNC: 147 MG/DL — HIGH (ref 70–99)
GLUCOSE BLDC GLUCOMTR-MCNC: 172 MG/DL — HIGH (ref 70–99)
GLUCOSE SERPL-MCNC: 108 MG/DL — HIGH (ref 70–99)
HCT VFR BLD CALC: 41 % — SIGNIFICANT CHANGE UP (ref 34.5–45)
HGB BLD-MCNC: 13.1 G/DL — SIGNIFICANT CHANGE UP (ref 11.5–15.5)
IMM GRANULOCYTES NFR BLD AUTO: 0.5 % — SIGNIFICANT CHANGE UP (ref 0–1.5)
LYMPHOCYTES # BLD AUTO: 0.86 K/UL — LOW (ref 1–3.3)
LYMPHOCYTES # BLD AUTO: 5.4 % — LOW (ref 13–44)
MAGNESIUM SERPL-MCNC: 2.4 MG/DL — SIGNIFICANT CHANGE UP (ref 1.6–2.6)
MCHC RBC-ENTMCNC: 28.1 PG — SIGNIFICANT CHANGE UP (ref 27–34)
MCHC RBC-ENTMCNC: 32 GM/DL — SIGNIFICANT CHANGE UP (ref 32–36)
MCV RBC AUTO: 88 FL — SIGNIFICANT CHANGE UP (ref 80–100)
MONOCYTES # BLD AUTO: 0.38 K/UL — SIGNIFICANT CHANGE UP (ref 0–0.9)
MONOCYTES NFR BLD AUTO: 2.4 % — SIGNIFICANT CHANGE UP (ref 2–14)
NEUTROPHILS # BLD AUTO: 14.44 K/UL — HIGH (ref 1.8–7.4)
NEUTROPHILS NFR BLD AUTO: 91.5 % — HIGH (ref 43–77)
NRBC # BLD: 0 /100 WBCS — SIGNIFICANT CHANGE UP (ref 0–0)
PHOSPHATE SERPL-MCNC: 3.4 MG/DL — SIGNIFICANT CHANGE UP (ref 2.5–4.5)
PLATELET # BLD AUTO: 429 K/UL — HIGH (ref 150–400)
POTASSIUM SERPL-MCNC: 4 MMOL/L — SIGNIFICANT CHANGE UP (ref 3.5–5.3)
POTASSIUM SERPL-SCNC: 4 MMOL/L — SIGNIFICANT CHANGE UP (ref 3.5–5.3)
PROT SERPL-MCNC: 7.3 G/DL — SIGNIFICANT CHANGE UP (ref 6–8.3)
RBC # BLD: 4.66 M/UL — SIGNIFICANT CHANGE UP (ref 3.8–5.2)
RBC # FLD: 13.5 % — SIGNIFICANT CHANGE UP (ref 10.3–14.5)
SODIUM SERPL-SCNC: 139 MMOL/L — SIGNIFICANT CHANGE UP (ref 135–145)
WBC # BLD: 15.8 K/UL — HIGH (ref 3.8–10.5)
WBC # FLD AUTO: 15.8 K/UL — HIGH (ref 3.8–10.5)

## 2020-04-07 PROCEDURE — 99233 SBSQ HOSP IP/OBS HIGH 50: CPT

## 2020-04-07 RX ORDER — TOCILIZUMAB 20 MG/ML
400 INJECTION, SOLUTION, CONCENTRATE INTRAVENOUS ONCE
Refills: 0 | Status: COMPLETED | OUTPATIENT
Start: 2020-04-07 | End: 2020-04-07

## 2020-04-07 RX ADMIN — Medication 3 MILLIGRAM(S): at 21:36

## 2020-04-07 RX ADMIN — Medication 0.5 MILLIGRAM(S): at 09:36

## 2020-04-07 RX ADMIN — FAMOTIDINE 20 MILLIGRAM(S): 10 INJECTION INTRAVENOUS at 12:40

## 2020-04-07 RX ADMIN — AZITHROMYCIN 250 MILLIGRAM(S): 500 TABLET, FILM COATED ORAL at 14:18

## 2020-04-07 RX ADMIN — ENOXAPARIN SODIUM 40 MILLIGRAM(S): 100 INJECTION SUBCUTANEOUS at 21:36

## 2020-04-07 RX ADMIN — Medication 1: at 16:40

## 2020-04-07 RX ADMIN — BUDESONIDE AND FORMOTEROL FUMARATE DIHYDRATE 2 PUFF(S): 160; 4.5 AEROSOL RESPIRATORY (INHALATION) at 21:37

## 2020-04-07 RX ADMIN — Medication 40 MILLIGRAM(S): at 21:36

## 2020-04-07 RX ADMIN — ALBUTEROL 2 PUFF(S): 90 AEROSOL, METERED ORAL at 16:39

## 2020-04-07 RX ADMIN — Medication 40 MILLIGRAM(S): at 12:41

## 2020-04-07 RX ADMIN — ALBUTEROL 2 PUFF(S): 90 AEROSOL, METERED ORAL at 21:37

## 2020-04-07 RX ADMIN — ALBUTEROL 2 PUFF(S): 90 AEROSOL, METERED ORAL at 12:11

## 2020-04-07 RX ADMIN — SERTRALINE 25 MILLIGRAM(S): 25 TABLET, FILM COATED ORAL at 12:41

## 2020-04-07 RX ADMIN — Medication 200 MILLIGRAM(S): at 04:02

## 2020-04-07 RX ADMIN — ALBUTEROL 2 PUFF(S): 90 AEROSOL, METERED ORAL at 03:50

## 2020-04-07 RX ADMIN — TOCILIZUMAB 100 MILLIGRAM(S): 20 INJECTION, SOLUTION, CONCENTRATE INTRAVENOUS at 15:34

## 2020-04-07 RX ADMIN — Medication 200 MILLIGRAM(S): at 16:42

## 2020-04-07 RX ADMIN — Medication 650 MILLIGRAM(S): at 02:40

## 2020-04-07 RX ADMIN — BUDESONIDE AND FORMOTEROL FUMARATE DIHYDRATE 2 PUFF(S): 160; 4.5 AEROSOL RESPIRATORY (INHALATION) at 12:43

## 2020-04-07 NOTE — PROGRESS NOTE ADULT - PROBLEM SELECTOR PLAN 1
covid +, hypoxia requiring 15L NRB  -isolation precautions  -tylenol q6 prn  -azithro 5 d 250 (started 4/5)  -plaquenil 5 days (started 4/5)  -Monitor QTC (4/6/2020 )  -cough meds prn  -Oxygen, currently 93% on NRB 10L, titrate down as tolerated   -If worsens will consider steroids  -monitor wbc, fu bcx- consider ctx if worsens/ wbc remains high for superimprosed pna covid +, hypoxia requiring 15L NRB  -isolation precautions  -tylenol q6 prn  -azithro 5 d 250 (started 4/5)  -plaquenil 5 days (started 4/5)  -Monitor QTC (4/6/2020 )  -cough meds prn  -Oxygen, currently 93% on NRB 15L, titrate down as tolerated   -Steroids started 4/7  -monitor wbc, as it has been uptrending. Fu bcx- consider ctx if worsens/ wbc remains high for superimprosed pna. Also f/u with repeat UA/UC results covid +, hypoxia requiring 15L NRB  -isolation precautions  -tylenol q6 prn  -azithro 5 d 250 (started 4/5)  -plaquenil 5 days (started 4/5)  -Monitor QTC (4/6/2020 ; EKG 4/7 still pending, RN to complete)  -cough meds prn  -Oxygen, currently 93-96% on NRB 15L, titrate down as tolerated   -Steroids started 4/7  -monitor wbc, as it has been uptrending. Fu bcx- consider ctx if worsens/ wbc remains high for superimprosed pna. Also f/u with repeat UA/UC results

## 2020-04-07 NOTE — PROGRESS NOTE ADULT - PROBLEM SELECTOR PLAN 2
-never intubated, mild intermittent but also on montelukast  -albuterol q6 standing  -Symbicort  bid  -steroids if worsens  -consider restarting monteluklast -never intubated, mild intermittent but also on montelukast  -albuterol q6 standing  -Symbicort  bid  -Started steroids 4/7/2020  -consider restarting monteluklast

## 2020-04-07 NOTE — PROGRESS NOTE ADULT - ASSESSMENT
74 F former smoker, PMH asthma (never intubated, ran out of albuterol), HTN, HLD, DM p/w 5 days SOB/ cough, admitted for hypoxia 2/2 COVID (+). She is intermittently anxious reporting insomnia; xanax is being given PRN. Attempts to wean off of the NRB have been unsuccessful and her COVID labs are trending upwards; given this, steroids were started on 4/7 and Toci was ordered. She is currently 96% on 15L NRB. 74 F former smoker, PMH asthma (never intubated, ran out of albuterol), HTN, HLD, DM p/w 5 days SOB/ cough, admitted for hypoxia 2/2 COVID (+). She is intermittently anxious reporting insomnia; xanax is being given PRN. Attempts to wean off of the NRB have been unsuccessful and her COVID labs are trending upwards; given this, steroids were started on 4/7; Tocilizumab also to start today. She is currently 96% on 15L NRB. 74 F former smoker, PMH asthma (never intubated, ran out of albuterol), HTN, HLD, DM p/w 5 days SOB/ cough, admitted for hypoxia 2/2 COVID (+). She is intermittently anxious reporting insomnia; xanax is being given PRN. Attempts to wean off of the NRB have been unsuccessful and her COVID labs are trending up; given this, steroids were started on 4/7; Tocilizumab also to start today. She is currently 96% on 15L NRB.

## 2020-04-07 NOTE — PROGRESS NOTE ADULT - SUBJECTIVE AND OBJECTIVE BOX
INTERVAL HPI/OVERNIGHT EVENTS:  Patient was seen and examined at bedside. As per nurse and patient, no o/n events, patient resting comfortably. No complaints at this time. Patient denies: fever, chills, dizziness, weakness, HA, Changes in vision, CP, palpitations, SOB, cough, N/V/D/C, dysuria, changes in bowel movements, LE edema. ROS otherwise negative.    VITAL SIGNS:  T(F): 99.5 (20 @ 04:05)  HR: 63 (20 @ 09:31)  BP: 131/55 (20 @ 09:31)  RR: 20 (20 @ 09:31)  SpO2: 93% (20 @ 09:31)  Wt(kg): --    PHYSICAL EXAM:    Constitutional: WDWN, NAD  HEENT: PERRL, EOMI, sclera non-icteric, neck supple, trachea midline, no masses, no JVD, MMM, good dentition  Respiratory: CTA b/l, good air entry b/l, no wheezing, no rhonchi, no rales, without accessory muscle use and no intercostal retractions  Cardiovascular: RRR, normal S1S2, no M/R/G  Gastrointestinal: soft, NTND, no masses palpable, BS normal  Extremities: Warm, well perfused, pulses equal bilateral upper and lower extremities, no edema, no clubbing  Neurological: AAOx3, CN Grossly intact  Skin: Normal temperature, warm, dry    MEDICATIONS  (STANDING):  ALBUTerol    90 MICROgram(s) HFA Inhaler 2 Puff(s) Inhalation every 6 hours  azithromycin   Tablet 250 milliGRAM(s) Oral every 24 hours  budesonide 160 MICROgram(s)/formoterol 4.5 MICROgram(s) Inhaler 2 Puff(s) Inhalation two times a day  dextrose 5%. 1000 milliLiter(s) (50 mL/Hr) IV Continuous <Continuous>  dextrose 50% Injectable 12.5 Gram(s) IV Push once  dextrose 50% Injectable 25 Gram(s) IV Push once  dextrose 50% Injectable 25 Gram(s) IV Push once  enoxaparin Injectable 40 milliGRAM(s) SubCutaneous every 24 hours  famotidine    Tablet 20 milliGRAM(s) Oral daily  hydroxychloroquine   Oral   hydroxychloroquine 200 milliGRAM(s) Oral every 12 hours  insulin lispro (HumaLOG) corrective regimen sliding scale   SubCutaneous Before meals and at bedtime  melatonin 3 milliGRAM(s) Oral at bedtime  methylPREDNISolone sodium succinate Injectable 40 milliGRAM(s) IV Push every 12 hours  sertraline 25 milliGRAM(s) Oral daily    MEDICATIONS  (PRN):  acetaminophen   Tablet .. 650 milliGRAM(s) Oral every 6 hours PRN Temp greater or equal to 38C (100.4F), Mild Pain (1 - 3)  ALPRAZolam 0.5 milliGRAM(s) Oral at bedtime PRN anxiety  aluminum hydroxide/magnesium hydroxide/simethicone Suspension 30 milliLiter(s) Oral every 6 hours PRN Dyspepsia  dextrose 40% Gel 15 Gram(s) Oral once PRN Blood Glucose LESS THAN 70 milliGRAM(s)/deciliter  glucagon  Injectable 1 milliGRAM(s) IntraMuscular once PRN Glucose LESS THAN 70 milligrams/deciliter  guaifenesin/dextromethorphan  Syrup 5 milliLiter(s) Oral every 8 hours PRN Cough      Allergies    No Known Allergies    Intolerances        LABS:                        13.1   15.80 )-----------( 429      ( 2020 09:22 )             41.0     04-07    139  |  98  |  13  ----------------------------<  108<H>  4.0   |  26  |  0.51    Ca    9.2      2020 09:22  Phos  3.4     04-07  Mg     2.4     04-07    TPro  7.3  /  Alb  3.1<L>  /  TBili  0.9  /  DBili  x   /  AST  45<H>  /  ALT  45  /  AlkPhos  106  04-07    PT/INR - ( 2020 14:23 )   PT: 12.4 sec;   INR: 1.08          PTT - ( 2020 14:23 )  PTT:31.6 sec  Urinalysis Basic - ( 2020 15:52 )    Color: Yellow / Appearance: Clear / S.020 / pH: x  Gluc: x / Ketone: 15 mg/dL  / Bili: Negative / Urobili: 2.0 E.U./dL   Blood: x / Protein: 30 mg/dL / Nitrite: NEGATIVE   Leuk Esterase: Moderate / RBC: < 5 /HPF / WBC 5-10 /HPF   Sq Epi: x / Non Sq Epi: 0-5 /HPF / Bacteria: Present /HPF        RADIOLOGY & ADDITIONAL TESTS:  Reviewed INTERVAL HPI/OVERNIGHT EVENTS:  Patient was seen and examined at bedside. As per nurse and patient, had a low grade fever overnight, 100.3F. Patient sitting up on the edge of the bed eating breakfast. She reports not being able to sleep overnight s/d lights in the room; she is c/o feeling anxious this morning, which she states is "making it harder to breath". Patient denies: chills, dizziness, weakness, HA, Changes in vision, CP, palpitations, cough, N/V/D/C, dysuria, changes in bowel movements, LE edema. ROS otherwise negative.    VITAL SIGNS:  T(F): 99.5 (20 @ 04:05)  HR: 63 (20 @ 09:31)  BP: 131/55 (20 @ 09:31)  RR: 20 (20 @ 09:31)  SpO2: 93% (20 @ 09:31)  Wt(kg): --    PHYSICAL EXAM:    Constitutional: acutely Ill appearing elderly female  HEENT: EOMI, sclera non-icteric, neck supple, no masses, MMM  Respiratory: fine crackles present b/l, no wheezing, no rhonchi, without accessory muscle use and no intercostal retractions  Cardiovascular: RRR, normal S1S2, no M/R/G  Gastrointestinal: soft, NTND, no masses palpable, BS normal  Extremities: Warm, well perfused, pulses equal bilateral upper and lower extremities, no edema, no clubbing  Neurological: AAOx3, appears anxious, CN Grossly intact  Skin: Normal temperature, warm, dry    MEDICATIONS  (STANDING):  ALBUTerol    90 MICROgram(s) HFA Inhaler 2 Puff(s) Inhalation every 6 hours  azithromycin   Tablet 250 milliGRAM(s) Oral every 24 hours  budesonide 160 MICROgram(s)/formoterol 4.5 MICROgram(s) Inhaler 2 Puff(s) Inhalation two times a day  dextrose 5%. 1000 milliLiter(s) (50 mL/Hr) IV Continuous <Continuous>  dextrose 50% Injectable 12.5 Gram(s) IV Push once  dextrose 50% Injectable 25 Gram(s) IV Push once  dextrose 50% Injectable 25 Gram(s) IV Push once  enoxaparin Injectable 40 milliGRAM(s) SubCutaneous every 24 hours  famotidine    Tablet 20 milliGRAM(s) Oral daily  hydroxychloroquine   Oral   hydroxychloroquine 200 milliGRAM(s) Oral every 12 hours  insulin lispro (HumaLOG) corrective regimen sliding scale   SubCutaneous Before meals and at bedtime  melatonin 3 milliGRAM(s) Oral at bedtime  methylPREDNISolone sodium succinate Injectable 40 milliGRAM(s) IV Push every 12 hours  sertraline 25 milliGRAM(s) Oral daily    MEDICATIONS  (PRN):  acetaminophen   Tablet .. 650 milliGRAM(s) Oral every 6 hours PRN Temp greater or equal to 38C (100.4F), Mild Pain (1 - 3)  ALPRAZolam 0.5 milliGRAM(s) Oral at bedtime PRN anxiety  aluminum hydroxide/magnesium hydroxide/simethicone Suspension 30 milliLiter(s) Oral every 6 hours PRN Dyspepsia  dextrose 40% Gel 15 Gram(s) Oral once PRN Blood Glucose LESS THAN 70 milliGRAM(s)/deciliter  glucagon  Injectable 1 milliGRAM(s) IntraMuscular once PRN Glucose LESS THAN 70 milligrams/deciliter  guaifenesin/dextromethorphan  Syrup 5 milliLiter(s) Oral every 8 hours PRN Cough      Allergies    No Known Allergies    Intolerances        LABS:                        13.1   15.80 )-----------( 429      ( 2020 09:22 )             41.0     04-07    139  |  98  |  13  ----------------------------<  108<H>  4.0   |  26  |  0.51    Ca    9.2      2020 09:22  Phos  3.4     04-07  Mg     2.4     04-07    TPro  7.3  /  Alb  3.1<L>  /  TBili  0.9  /  DBili  x   /  AST  45<H>  /  ALT  45  /  AlkPhos  106  04-07    PT/INR - ( 2020 14:23 )   PT: 12.4 sec;   INR: 1.08          PTT - ( 2020 14:23 )  PTT:31.6 sec  Urinalysis Basic - ( 2020 15:52 )    Color: Yellow / Appearance: Clear / S.020 / pH: x  Gluc: x / Ketone: 15 mg/dL  / Bili: Negative / Urobili: 2.0 E.U./dL   Blood: x / Protein: 30 mg/dL / Nitrite: NEGATIVE   Leuk Esterase: Moderate / RBC: < 5 /HPF / WBC 5-10 /HPF   Sq Epi: x / Non Sq Epi: 0-5 /HPF / Bacteria: Present /HPF        RADIOLOGY & ADDITIONAL TESTS:  Reviewed

## 2020-04-07 NOTE — PROGRESS NOTE ADULT - PROBLEM SELECTOR PLAN 3
-C/w home zoloft, prn xanax  -melatonin for sleep- can give prn ambien 5 if needs (on 10 at home) -C/w home zoloft, prn xanax  -melatonin for sleep

## 2020-04-08 LAB
ALBUMIN SERPL ELPH-MCNC: 3.3 G/DL — SIGNIFICANT CHANGE UP (ref 3.3–5)
ALP SERPL-CCNC: 114 U/L — SIGNIFICANT CHANGE UP (ref 40–120)
ALT FLD-CCNC: 54 U/L — HIGH (ref 10–45)
ANION GAP SERPL CALC-SCNC: 17 MMOL/L — SIGNIFICANT CHANGE UP (ref 5–17)
APPEARANCE UR: ABNORMAL
AST SERPL-CCNC: 47 U/L — HIGH (ref 10–40)
BACTERIA # UR AUTO: PRESENT /HPF
BASOPHILS # BLD AUTO: 0.01 K/UL — SIGNIFICANT CHANGE UP (ref 0–0.2)
BASOPHILS NFR BLD AUTO: 0.1 % — SIGNIFICANT CHANGE UP (ref 0–2)
BILIRUB SERPL-MCNC: 0.9 MG/DL — SIGNIFICANT CHANGE UP (ref 0.2–1.2)
BILIRUB UR-MCNC: NEGATIVE — SIGNIFICANT CHANGE UP
BUN SERPL-MCNC: 15 MG/DL — SIGNIFICANT CHANGE UP (ref 7–23)
CALCIUM SERPL-MCNC: 9.4 MG/DL — SIGNIFICANT CHANGE UP (ref 8.4–10.5)
CHLORIDE SERPL-SCNC: 99 MMOL/L — SIGNIFICANT CHANGE UP (ref 96–108)
CO2 SERPL-SCNC: 24 MMOL/L — SIGNIFICANT CHANGE UP (ref 22–31)
COLOR SPEC: YELLOW — SIGNIFICANT CHANGE UP
COMMENT - URINE: SIGNIFICANT CHANGE UP
CREAT SERPL-MCNC: 0.42 MG/DL — LOW (ref 0.5–1.3)
CRP SERPL-MCNC: 18.46 MG/DL — HIGH (ref 0–0.4)
D DIMER BLD IA.RAPID-MCNC: 556 NG/ML DDU — HIGH
DIFF PNL FLD: NEGATIVE — SIGNIFICANT CHANGE UP
EOSINOPHIL # BLD AUTO: 0 K/UL — SIGNIFICANT CHANGE UP (ref 0–0.5)
EOSINOPHIL NFR BLD AUTO: 0 % — SIGNIFICANT CHANGE UP (ref 0–6)
EPI CELLS # UR: SIGNIFICANT CHANGE UP /HPF (ref 0–5)
FERRITIN SERPL-MCNC: 1071 NG/ML — HIGH (ref 15–150)
GLUCOSE BLDC GLUCOMTR-MCNC: 152 MG/DL — HIGH (ref 70–99)
GLUCOSE BLDC GLUCOMTR-MCNC: 171 MG/DL — HIGH (ref 70–99)
GLUCOSE BLDC GLUCOMTR-MCNC: 172 MG/DL — HIGH (ref 70–99)
GLUCOSE BLDC GLUCOMTR-MCNC: 200 MG/DL — HIGH (ref 70–99)
GLUCOSE SERPL-MCNC: 140 MG/DL — HIGH (ref 70–99)
GLUCOSE UR QL: NEGATIVE — SIGNIFICANT CHANGE UP
HCT VFR BLD CALC: 42.3 % — SIGNIFICANT CHANGE UP (ref 34.5–45)
HGB BLD-MCNC: 13.4 G/DL — SIGNIFICANT CHANGE UP (ref 11.5–15.5)
IMM GRANULOCYTES NFR BLD AUTO: 0.9 % — SIGNIFICANT CHANGE UP (ref 0–1.5)
KETONES UR-MCNC: ABNORMAL MG/DL
LEUKOCYTE ESTERASE UR-ACNC: ABNORMAL
LYMPHOCYTES # BLD AUTO: 1.03 K/UL — SIGNIFICANT CHANGE UP (ref 1–3.3)
LYMPHOCYTES # BLD AUTO: 5.5 % — LOW (ref 13–44)
MAGNESIUM SERPL-MCNC: 2.2 MG/DL — SIGNIFICANT CHANGE UP (ref 1.6–2.6)
MCHC RBC-ENTMCNC: 27.9 PG — SIGNIFICANT CHANGE UP (ref 27–34)
MCHC RBC-ENTMCNC: 31.7 GM/DL — LOW (ref 32–36)
MCV RBC AUTO: 88.1 FL — SIGNIFICANT CHANGE UP (ref 80–100)
MONOCYTES # BLD AUTO: 0.41 K/UL — SIGNIFICANT CHANGE UP (ref 0–0.9)
MONOCYTES NFR BLD AUTO: 2.2 % — SIGNIFICANT CHANGE UP (ref 2–14)
NEUTROPHILS # BLD AUTO: 17.02 K/UL — HIGH (ref 1.8–7.4)
NEUTROPHILS NFR BLD AUTO: 91.3 % — HIGH (ref 43–77)
NITRITE UR-MCNC: NEGATIVE — SIGNIFICANT CHANGE UP
NRBC # BLD: 0 /100 WBCS — SIGNIFICANT CHANGE UP (ref 0–0)
PH UR: 6 — SIGNIFICANT CHANGE UP (ref 5–8)
PHOSPHATE SERPL-MCNC: 3.6 MG/DL — SIGNIFICANT CHANGE UP (ref 2.5–4.5)
PLATELET # BLD AUTO: 480 K/UL — HIGH (ref 150–400)
POTASSIUM SERPL-MCNC: 4.2 MMOL/L — SIGNIFICANT CHANGE UP (ref 3.5–5.3)
POTASSIUM SERPL-SCNC: 4.2 MMOL/L — SIGNIFICANT CHANGE UP (ref 3.5–5.3)
PROT SERPL-MCNC: 7.3 G/DL — SIGNIFICANT CHANGE UP (ref 6–8.3)
PROT UR-MCNC: 30 MG/DL
RBC # BLD: 4.8 M/UL — SIGNIFICANT CHANGE UP (ref 3.8–5.2)
RBC # FLD: 13.5 % — SIGNIFICANT CHANGE UP (ref 10.3–14.5)
RBC CASTS # UR COMP ASSIST: < 5 /HPF — SIGNIFICANT CHANGE UP
SODIUM SERPL-SCNC: 140 MMOL/L — SIGNIFICANT CHANGE UP (ref 135–145)
SP GR SPEC: >=1.03 — SIGNIFICANT CHANGE UP (ref 1–1.03)
UROBILINOGEN FLD QL: 1 E.U./DL — SIGNIFICANT CHANGE UP
WBC # BLD: 18.63 K/UL — HIGH (ref 3.8–10.5)
WBC # FLD AUTO: 18.63 K/UL — HIGH (ref 3.8–10.5)
WBC UR QL: ABNORMAL /HPF

## 2020-04-08 PROCEDURE — 99233 SBSQ HOSP IP/OBS HIGH 50: CPT

## 2020-04-08 RX ADMIN — Medication 1: at 22:52

## 2020-04-08 RX ADMIN — Medication 1: at 16:17

## 2020-04-08 RX ADMIN — Medication 3 MILLIGRAM(S): at 23:04

## 2020-04-08 RX ADMIN — SERTRALINE 25 MILLIGRAM(S): 25 TABLET, FILM COATED ORAL at 12:33

## 2020-04-08 RX ADMIN — ALBUTEROL 2 PUFF(S): 90 AEROSOL, METERED ORAL at 11:03

## 2020-04-08 RX ADMIN — ENOXAPARIN SODIUM 40 MILLIGRAM(S): 100 INJECTION SUBCUTANEOUS at 22:51

## 2020-04-08 RX ADMIN — Medication 0.5 MILLIGRAM(S): at 23:46

## 2020-04-08 RX ADMIN — Medication 200 MILLIGRAM(S): at 17:44

## 2020-04-08 RX ADMIN — Medication 40 MILLIGRAM(S): at 17:44

## 2020-04-08 RX ADMIN — Medication 40 MILLIGRAM(S): at 06:08

## 2020-04-08 RX ADMIN — ALBUTEROL 2 PUFF(S): 90 AEROSOL, METERED ORAL at 06:09

## 2020-04-08 RX ADMIN — ALBUTEROL 2 PUFF(S): 90 AEROSOL, METERED ORAL at 23:04

## 2020-04-08 RX ADMIN — BUDESONIDE AND FORMOTEROL FUMARATE DIHYDRATE 2 PUFF(S): 160; 4.5 AEROSOL RESPIRATORY (INHALATION) at 23:03

## 2020-04-08 RX ADMIN — Medication 200 MILLIGRAM(S): at 06:08

## 2020-04-08 RX ADMIN — AZITHROMYCIN 250 MILLIGRAM(S): 500 TABLET, FILM COATED ORAL at 14:04

## 2020-04-08 RX ADMIN — ALBUTEROL 2 PUFF(S): 90 AEROSOL, METERED ORAL at 17:43

## 2020-04-08 RX ADMIN — FAMOTIDINE 20 MILLIGRAM(S): 10 INJECTION INTRAVENOUS at 11:02

## 2020-04-08 RX ADMIN — Medication 1: at 06:09

## 2020-04-08 RX ADMIN — BUDESONIDE AND FORMOTEROL FUMARATE DIHYDRATE 2 PUFF(S): 160; 4.5 AEROSOL RESPIRATORY (INHALATION) at 11:02

## 2020-04-08 NOTE — PROGRESS NOTE ADULT - SUBJECTIVE AND OBJECTIVE BOX
INTERVAL HPI/OVERNIGHT EVENTS:  Patient was seen and examined at bedside. As per nurse and patient, no o/n events, patient anxious regarding her condition. Saturating % on NRB 15L, brought down to 10L doing well. Daughter came to visit her (Nurse Manager on 6th floor). No complaints at this time. Patient denies: fever, chills, dizziness, weakness, HA, Changes in vision, CP, palpitations, SOB, cough, N/V/D/C, dysuria, changes in bowel movements, LE edema. ROS otherwise negative.    VITAL SIGNS:  T(F): 98.1 (04-08-20 @ 10:08)  HR: 56 (04-08-20 @ 10:08)  BP: 128/67 (04-08-20 @ 10:08)  RR: 24 (04-08-20 @ 10:08)  SpO2: 94% (04-08-20 @ 10:08)  Wt(kg): --    PHYSICAL EXAM:    Constitutional: WDWN, NAD  HEENT: PERRL, EOMI, sclera non-icteric, neck supple, trachea midline, no masses, no JVD, MMM, good dentition  Respiratory: CTA b/l, good air entry b/l, no wheezing, no rhonchi, no rales, without accessory muscle use and no intercostal retractions  Cardiovascular: RRR, normal S1S2, no M/R/G  Gastrointestinal: soft, NTND, no masses palpable, BS normal  Extremities: Warm, well perfused, pulses equal bilateral upper and lower extremities, no edema, no clubbing  Neurological: AAOx3, CN Grossly intact  Skin: Normal temperature, warm, dry    MEDICATIONS  (STANDING):  ALBUTerol    90 MICROgram(s) HFA Inhaler 2 Puff(s) Inhalation every 6 hours  azithromycin   Tablet 250 milliGRAM(s) Oral every 24 hours  budesonide 160 MICROgram(s)/formoterol 4.5 MICROgram(s) Inhaler 2 Puff(s) Inhalation two times a day  dextrose 5%. 1000 milliLiter(s) (50 mL/Hr) IV Continuous <Continuous>  dextrose 50% Injectable 12.5 Gram(s) IV Push once  dextrose 50% Injectable 25 Gram(s) IV Push once  dextrose 50% Injectable 25 Gram(s) IV Push once  enoxaparin Injectable 40 milliGRAM(s) SubCutaneous every 24 hours  famotidine    Tablet 20 milliGRAM(s) Oral daily  hydroxychloroquine   Oral   hydroxychloroquine 200 milliGRAM(s) Oral every 12 hours  insulin lispro (HumaLOG) corrective regimen sliding scale   SubCutaneous Before meals and at bedtime  melatonin 3 milliGRAM(s) Oral at bedtime  methylPREDNISolone sodium succinate Injectable 40 milliGRAM(s) IV Push every 12 hours  sertraline 25 milliGRAM(s) Oral daily    MEDICATIONS  (PRN):  acetaminophen   Tablet .. 650 milliGRAM(s) Oral every 6 hours PRN Temp greater or equal to 38C (100.4F), Mild Pain (1 - 3)  ALPRAZolam 0.5 milliGRAM(s) Oral at bedtime PRN anxiety  aluminum hydroxide/magnesium hydroxide/simethicone Suspension 30 milliLiter(s) Oral every 6 hours PRN Dyspepsia  dextrose 40% Gel 15 Gram(s) Oral once PRN Blood Glucose LESS THAN 70 milliGRAM(s)/deciliter  glucagon  Injectable 1 milliGRAM(s) IntraMuscular once PRN Glucose LESS THAN 70 milligrams/deciliter  guaifenesin/dextromethorphan  Syrup 5 milliLiter(s) Oral every 8 hours PRN Cough      Allergies    No Known Allergies    Intolerances        LABS:                        13.4   18.63 )-----------( 480      ( 08 Apr 2020 08:40 )             42.3     04-08    140  |  99  |  15  ----------------------------<  140<H>  4.2   |  24  |  0.42<L>    Ca    9.4      08 Apr 2020 08:40  Phos  3.6     04-08  Mg     2.2     04-08    TPro  7.3  /  Alb  3.3  /  TBili  0.9  /  DBili  x   /  AST  47<H>  /  ALT  54<H>  /  AlkPhos  114  04-08      Urinalysis Basic - ( 08 Apr 2020 03:48 )    Color: Yellow / Appearance: SL Cloudy / SG: >=1.030 / pH: x  Gluc: x / Ketone: Trace mg/dL  / Bili: Negative / Urobili: 1.0 E.U./dL   Blood: x / Protein: 30 mg/dL / Nitrite: NEGATIVE   Leuk Esterase: Trace / RBC: < 5 /HPF / WBC 5-10 /HPF   Sq Epi: x / Non Sq Epi: 0-5 /HPF / Bacteria: Present /HPF        RADIOLOGY & ADDITIONAL TESTS:  Reviewed

## 2020-04-08 NOTE — PROGRESS NOTE ADULT - PROBLEM SELECTOR PLAN 2
-never intubated, mild intermittent but also on montelukast  -albuterol q6 standing  -Symbicort  bid  -Started steroids 4/7/2020  -consider restarting monteluklast

## 2020-04-08 NOTE — PROGRESS NOTE ADULT - PROBLEM SELECTOR PLAN 1
covid +, hypoxia requiring 15L NRB  -isolation precautions  -tylenol q6 prn  -azithro 5 d 250 (started 4/5)  -plaquenil 5 days (started 4/5)  -Monitor QTC (4/6/2020 ; EKG 4/7 , RN to complete)  -cough meds prn  -Oxygen, currently 93-96% on NRB 15L, titrate down as tolerated   -Steroids started 4/7  -monitor wbc, as it has been uptrending. Fu bcx- consider ctx if worsens/ wbc remains high for superimprosed pna. Also f/u with repeat UA/UC results

## 2020-04-08 NOTE — CHART NOTE - NSCHARTNOTEFT_GEN_A_CORE
Admitting Diagnosis:   Patient is a 74y old  Female who presents with a chief complaint of covid (07 Apr 2020 12:48).  Nutrition assessment for LOS.  PMH of asthma, HTN, HLD, DM.  Pt COVID positive; attempts to wean off NRB unsuccessful.  Steroids started on 4.7.       PAST MEDICAL & SURGICAL HISTORY:  Anxiety  Diabetes  Hypercholesteremia  Essential hypertension  No significant past surgical history    Current Nutrition Order:  Diet, DASH/TLC:   Sodium & Cholesterol Restricted  Consistent Carbohydrate {No Snacks} (CSTCHO) (04-05-20 @ 16:36)    PO Intake: Good (%) [   ]  Fair (50-75%) [ x  ] Poor (<25%) [   ]    GI Issues: BM documented on 4.6    Pain: Denies per flowsheets    Skin Integrity: No edema; no pressure injuries noted.    Labs: BMP reviewed; nothing nutritionally significant.  Pt with diabetes; good glycemic control via POC fingersticks.    04-08    140  |  99  |  15  ----------------------------<  140<H>  4.2   |  24  |  0.42<L>    Ca    9.4      08 Apr 2020 08:40  Phos  3.6     04-08  Mg     2.2     04-08    TPro  7.3  /  Alb  3.3  /  TBili  0.9  /  DBili  x   /  AST  47<H>  /  ALT  54<H>  /  AlkPhos  114  04-08    CAPILLARY BLOOD GLUCOSE  POCT Blood Glucose.: 152 mg/dL (08 Apr 2020 06:00)  POCT Blood Glucose.: 147 mg/dL (07 Apr 2020 21:23)  POCT Blood Glucose.: 172 mg/dL (07 Apr 2020 16:15)  POCT Blood Glucose.: 120 mg/dL (07 Apr 2020 12:11)    Medications:  MEDICATIONS  (STANDING):  ALBUTerol    90 MICROgram(s) HFA Inhaler 2 Puff(s) Inhalation every 6 hours  azithromycin   Tablet 250 milliGRAM(s) Oral every 24 hours  budesonide 160 MICROgram(s)/formoterol 4.5 MICROgram(s) Inhaler 2 Puff(s) Inhalation two times a day  dextrose 5%. 1000 milliLiter(s) (50 mL/Hr) IV Continuous <Continuous>  dextrose 50% Injectable 12.5 Gram(s) IV Push once  dextrose 50% Injectable 25 Gram(s) IV Push once  dextrose 50% Injectable 25 Gram(s) IV Push once  enoxaparin Injectable 40 milliGRAM(s) SubCutaneous every 24 hours  famotidine    Tablet 20 milliGRAM(s) Oral daily  hydroxychloroquine   Oral   hydroxychloroquine 200 milliGRAM(s) Oral every 12 hours  insulin lispro (HumaLOG) corrective regimen sliding scale   SubCutaneous Before meals and at bedtime  melatonin 3 milliGRAM(s) Oral at bedtime  methylPREDNISolone sodium succinate Injectable 40 milliGRAM(s) IV Push every 12 hours  sertraline 25 milliGRAM(s) Oral daily    MEDICATIONS  (PRN):  acetaminophen   Tablet .. 650 milliGRAM(s) Oral every 6 hours PRN Temp greater or equal to 38C (100.4F), Mild Pain (1 - 3)  ALPRAZolam 0.5 milliGRAM(s) Oral at bedtime PRN anxiety  aluminum hydroxide/magnesium hydroxide/simethicone Suspension 30 milliLiter(s) Oral every 6 hours PRN Dyspepsia  dextrose 40% Gel 15 Gram(s) Oral once PRN Blood Glucose LESS THAN 70 milliGRAM(s)/deciliter  glucagon  Injectable 1 milliGRAM(s) IntraMuscular once PRN Glucose LESS THAN 70 milligrams/deciliter  guaifenesin/dextromethorphan  Syrup 5 milliLiter(s) Oral every 8 hours PRN Cough      Anthropometrics:  Admission Wt: 53.5kg (4.5); Ht: 157.48cm; BMI: 21.6; IBW: 50; %IBW: 107%  Weight Change: None noted    Nutrition Focused Physical Exam: Completed [   ]  Unable to complete [ x  ]  Unable to conduct a face to face interview or nutrition-focused physical exam due to limited contact restrictions related to the pt's medical condition and isolation precautions.     Estimated energy needs: Based on CBW as data between % IBW.  Nutrient needs based on [facility] standards of care for older adults and COVID.  Calories: 4266-3941.5 kcals (20-25 kcals)  Protein: 58-70 gm pro (1.1-1.3 gm pro/kg)  Fluids:     Subjective:     Nutrition Diagnosis:    [  ] No active nutrition diagnosis at this time  [  ] Current medical condition precludes nutrition intervention    Goal:    Recommendations:    Education:     Risk Level: High [   ] Moderate [   ] Low [   ] Admitting Diagnosis:   Patient is a 74y old  Female who presents with a chief complaint of covid (07 Apr 2020 12:48).  Nutrition assessment for LOS.  PMH of asthma, HTN, HLD, DM.  Pt COVID positive; attempts to wean off NRB unsuccessful.  Steroids started on 4.7.  Spoke to Pt's daughter (Stefany Gomez) on phone; Pt with fair appetite/intake PTA.  No significant decrease.  No chewing/swallowing or GI complaints.  Following balanced meal plan at home.  No recent Wt changes.  Obtained food preferences.      PAST MEDICAL & SURGICAL HISTORY:  Anxiety  Diabetes  Hypercholesteremia  Essential hypertension  No significant past surgical history    Current Nutrition Order:  Diet, DASH/TLC:   Sodium & Cholesterol Restricted  Consistent Carbohydrate {No Snacks} (CSTCHO) (04-05-20 @ 16:36)    PO Intake: Good (%) [   ]  Fair (50-75%) [ x  ] Poor (<25%) [   ]    GI Issues: BM documented on 4.6    Pain: Denies per flowsheets    Skin Integrity: No edema; no pressure injuries noted.    Labs: BMP reviewed; nothing nutritionally significant.  Pt with diabetes; good glycemic control via POC fingersticks.    04-08    140  |  99  |  15  ----------------------------<  140<H>  4.2   |  24  |  0.42<L>    Ca    9.4      08 Apr 2020 08:40  Phos  3.6     04-08  Mg     2.2     04-08    TPro  7.3  /  Alb  3.3  /  TBili  0.9  /  DBili  x   /  AST  47<H>  /  ALT  54<H>  /  AlkPhos  114  04-08    CAPILLARY BLOOD GLUCOSE  POCT Blood Glucose.: 152 mg/dL (08 Apr 2020 06:00)  POCT Blood Glucose.: 147 mg/dL (07 Apr 2020 21:23)  POCT Blood Glucose.: 172 mg/dL (07 Apr 2020 16:15)  POCT Blood Glucose.: 120 mg/dL (07 Apr 2020 12:11)    Medications:  MEDICATIONS  (STANDING):  ALBUTerol    90 MICROgram(s) HFA Inhaler 2 Puff(s) Inhalation every 6 hours  azithromycin   Tablet 250 milliGRAM(s) Oral every 24 hours  budesonide 160 MICROgram(s)/formoterol 4.5 MICROgram(s) Inhaler 2 Puff(s) Inhalation two times a day  dextrose 5%. 1000 milliLiter(s) (50 mL/Hr) IV Continuous <Continuous>  dextrose 50% Injectable 12.5 Gram(s) IV Push once  dextrose 50% Injectable 25 Gram(s) IV Push once  dextrose 50% Injectable 25 Gram(s) IV Push once  enoxaparin Injectable 40 milliGRAM(s) SubCutaneous every 24 hours  famotidine    Tablet 20 milliGRAM(s) Oral daily  hydroxychloroquine   Oral   hydroxychloroquine 200 milliGRAM(s) Oral every 12 hours  insulin lispro (HumaLOG) corrective regimen sliding scale   SubCutaneous Before meals and at bedtime  melatonin 3 milliGRAM(s) Oral at bedtime  methylPREDNISolone sodium succinate Injectable 40 milliGRAM(s) IV Push every 12 hours  sertraline 25 milliGRAM(s) Oral daily    MEDICATIONS  (PRN):  acetaminophen   Tablet .. 650 milliGRAM(s) Oral every 6 hours PRN Temp greater or equal to 38C (100.4F), Mild Pain (1 - 3)  ALPRAZolam 0.5 milliGRAM(s) Oral at bedtime PRN anxiety  aluminum hydroxide/magnesium hydroxide/simethicone Suspension 30 milliLiter(s) Oral every 6 hours PRN Dyspepsia  dextrose 40% Gel 15 Gram(s) Oral once PRN Blood Glucose LESS THAN 70 milliGRAM(s)/deciliter  glucagon  Injectable 1 milliGRAM(s) IntraMuscular once PRN Glucose LESS THAN 70 milligrams/deciliter  guaifenesin/dextromethorphan  Syrup 5 milliLiter(s) Oral every 8 hours PRN Cough      Anthropometrics:  Admission Wt: 53.5kg (4.5); Ht: 157.48cm; BMI: 21.6; IBW: 50; %IBW: 107%  Weight Change: None noted    Nutrition Focused Physical Exam: Completed [   ]  Unable to complete [ x  ]  Unable to conduct a face to face interview or nutrition-focused physical exam due to limited contact restrictions related to the pt's medical condition and isolation precautions.     Estimated energy needs: Based on CBW as data between % IBW.  Nutrient needs based on [facility] standards of care for older adults and COVID.  Calories: 6131-8050.5 kcals (20-25 kcals)  Protein: 58-70 gm pro (1.1-1.3 gm pro/kg)  Fluids: 2035-8018.5 ml (20-25 ml/kg)    Nutrition Diagnosis: Increased protein needs r/t increased demand of nutrient AEB COVID positive.    Goal: Po intake to meet within 10% of EER.    Recommendations:  - Continue DASH/TLC, consistent CHO diet order  - Incorporate food preferences into meal plan  - Continue POC fingersticks with goal  mg/dL while INPT  - Obtain weight q 7 days for weekly monitoring    Education: With Dtr; importance of adequate intake.  Alert RD of any food preferences.  Continue current diet.    Risk Level: High [   ] Moderate [   ] Low [ x  ]

## 2020-04-08 NOTE — PROGRESS NOTE ADULT - ASSESSMENT
74 F former smoker, PMH asthma (never intubated, ran out of albuterol), HTN, HLD, DM p/w 5 days SOB/ cough, admitted for hypoxia 2/2 COVID (+). She is intermittently anxious reporting insomnia; xanax is being given PRN. Attempts to wean off of the NRB have been unsuccessful and her COVID labs are trending up; given this, steroids were started on 4/7; Tocilizumab also started 4/7.

## 2020-04-09 LAB
ALBUMIN SERPL ELPH-MCNC: 3.3 G/DL — SIGNIFICANT CHANGE UP (ref 3.3–5)
ALP SERPL-CCNC: 114 U/L — SIGNIFICANT CHANGE UP (ref 40–120)
ALT FLD-CCNC: 101 U/L — HIGH (ref 10–45)
ANION GAP SERPL CALC-SCNC: 13 MMOL/L — SIGNIFICANT CHANGE UP (ref 5–17)
AST SERPL-CCNC: 54 U/L — HIGH (ref 10–40)
BASOPHILS # BLD AUTO: 0.01 K/UL — SIGNIFICANT CHANGE UP (ref 0–0.2)
BASOPHILS NFR BLD AUTO: 0.1 % — SIGNIFICANT CHANGE UP (ref 0–2)
BILIRUB SERPL-MCNC: 0.8 MG/DL — SIGNIFICANT CHANGE UP (ref 0.2–1.2)
BUN SERPL-MCNC: 17 MG/DL — SIGNIFICANT CHANGE UP (ref 7–23)
CALCIUM SERPL-MCNC: 9.5 MG/DL — SIGNIFICANT CHANGE UP (ref 8.4–10.5)
CHLORIDE SERPL-SCNC: 100 MMOL/L — SIGNIFICANT CHANGE UP (ref 96–108)
CO2 SERPL-SCNC: 27 MMOL/L — SIGNIFICANT CHANGE UP (ref 22–31)
CREAT SERPL-MCNC: 0.44 MG/DL — LOW (ref 0.5–1.3)
CRP SERPL-MCNC: 5.61 MG/DL — HIGH (ref 0–0.4)
CULTURE RESULTS: NO GROWTH — SIGNIFICANT CHANGE UP
D DIMER BLD IA.RAPID-MCNC: 476 NG/ML DDU — HIGH
EOSINOPHIL # BLD AUTO: 0 K/UL — SIGNIFICANT CHANGE UP (ref 0–0.5)
EOSINOPHIL NFR BLD AUTO: 0 % — SIGNIFICANT CHANGE UP (ref 0–6)
FERRITIN SERPL-MCNC: 1045 NG/ML — HIGH (ref 15–150)
GAMMA INTERFERON BACKGROUND BLD IA-ACNC: 0.02 IU/ML — SIGNIFICANT CHANGE UP
GLUCOSE BLDC GLUCOMTR-MCNC: 133 MG/DL — HIGH (ref 70–99)
GLUCOSE BLDC GLUCOMTR-MCNC: 148 MG/DL — HIGH (ref 70–99)
GLUCOSE BLDC GLUCOMTR-MCNC: 181 MG/DL — HIGH (ref 70–99)
GLUCOSE BLDC GLUCOMTR-MCNC: 182 MG/DL — HIGH (ref 70–99)
GLUCOSE SERPL-MCNC: 196 MG/DL — HIGH (ref 70–99)
HCT VFR BLD CALC: 40.4 % — SIGNIFICANT CHANGE UP (ref 34.5–45)
HGB BLD-MCNC: 12.8 G/DL — SIGNIFICANT CHANGE UP (ref 11.5–15.5)
IMM GRANULOCYTES NFR BLD AUTO: 0.8 % — SIGNIFICANT CHANGE UP (ref 0–1.5)
LYMPHOCYTES # BLD AUTO: 0.53 K/UL — LOW (ref 1–3.3)
LYMPHOCYTES # BLD AUTO: 2.7 % — LOW (ref 13–44)
M TB IFN-G BLD-IMP: ABNORMAL
M TB IFN-G CD4+ BCKGRND COR BLD-ACNC: 0 IU/ML — SIGNIFICANT CHANGE UP
M TB IFN-G CD4+CD8+ BCKGRND COR BLD-ACNC: 0 IU/ML — SIGNIFICANT CHANGE UP
MAGNESIUM SERPL-MCNC: 2.2 MG/DL — SIGNIFICANT CHANGE UP (ref 1.6–2.6)
MCHC RBC-ENTMCNC: 27.9 PG — SIGNIFICANT CHANGE UP (ref 27–34)
MCHC RBC-ENTMCNC: 31.7 GM/DL — LOW (ref 32–36)
MCV RBC AUTO: 88 FL — SIGNIFICANT CHANGE UP (ref 80–100)
MONOCYTES # BLD AUTO: 0.32 K/UL — SIGNIFICANT CHANGE UP (ref 0–0.9)
MONOCYTES NFR BLD AUTO: 1.6 % — LOW (ref 2–14)
NEUTROPHILS # BLD AUTO: 18.92 K/UL — HIGH (ref 1.8–7.4)
NEUTROPHILS NFR BLD AUTO: 94.8 % — HIGH (ref 43–77)
NRBC # BLD: 0 /100 WBCS — SIGNIFICANT CHANGE UP (ref 0–0)
PHOSPHATE SERPL-MCNC: 3.9 MG/DL — SIGNIFICANT CHANGE UP (ref 2.5–4.5)
PLATELET # BLD AUTO: 455 K/UL — HIGH (ref 150–400)
POTASSIUM SERPL-MCNC: 4.7 MMOL/L — SIGNIFICANT CHANGE UP (ref 3.5–5.3)
POTASSIUM SERPL-SCNC: 4.7 MMOL/L — SIGNIFICANT CHANGE UP (ref 3.5–5.3)
PROT SERPL-MCNC: 7.2 G/DL — SIGNIFICANT CHANGE UP (ref 6–8.3)
QUANT TB PLUS MITOGEN MINUS NIL: 0 IU/ML — SIGNIFICANT CHANGE UP
RBC # BLD: 4.59 M/UL — SIGNIFICANT CHANGE UP (ref 3.8–5.2)
RBC # FLD: 13.5 % — SIGNIFICANT CHANGE UP (ref 10.3–14.5)
SODIUM SERPL-SCNC: 140 MMOL/L — SIGNIFICANT CHANGE UP (ref 135–145)
SPECIMEN SOURCE: SIGNIFICANT CHANGE UP
WBC # BLD: 19.94 K/UL — HIGH (ref 3.8–10.5)
WBC # FLD AUTO: 19.94 K/UL — HIGH (ref 3.8–10.5)

## 2020-04-09 PROCEDURE — 99232 SBSQ HOSP IP/OBS MODERATE 35: CPT

## 2020-04-09 RX ORDER — ALPRAZOLAM 0.25 MG
0.25 TABLET ORAL AT BEDTIME
Refills: 0 | Status: DISCONTINUED | OUTPATIENT
Start: 2020-04-09 | End: 2020-04-13

## 2020-04-09 RX ADMIN — ALBUTEROL 2 PUFF(S): 90 AEROSOL, METERED ORAL at 06:46

## 2020-04-09 RX ADMIN — Medication 200 MILLIGRAM(S): at 06:47

## 2020-04-09 RX ADMIN — ALBUTEROL 2 PUFF(S): 90 AEROSOL, METERED ORAL at 22:41

## 2020-04-09 RX ADMIN — Medication 40 MILLIGRAM(S): at 18:48

## 2020-04-09 RX ADMIN — SERTRALINE 25 MILLIGRAM(S): 25 TABLET, FILM COATED ORAL at 11:30

## 2020-04-09 RX ADMIN — Medication 3 MILLIGRAM(S): at 22:40

## 2020-04-09 RX ADMIN — ALBUTEROL 2 PUFF(S): 90 AEROSOL, METERED ORAL at 11:47

## 2020-04-09 RX ADMIN — Medication 40 MILLIGRAM(S): at 06:47

## 2020-04-09 RX ADMIN — ENOXAPARIN SODIUM 40 MILLIGRAM(S): 100 INJECTION SUBCUTANEOUS at 22:40

## 2020-04-09 RX ADMIN — BUDESONIDE AND FORMOTEROL FUMARATE DIHYDRATE 2 PUFF(S): 160; 4.5 AEROSOL RESPIRATORY (INHALATION) at 22:42

## 2020-04-09 RX ADMIN — FAMOTIDINE 20 MILLIGRAM(S): 10 INJECTION INTRAVENOUS at 11:30

## 2020-04-09 RX ADMIN — BUDESONIDE AND FORMOTEROL FUMARATE DIHYDRATE 2 PUFF(S): 160; 4.5 AEROSOL RESPIRATORY (INHALATION) at 09:46

## 2020-04-09 RX ADMIN — Medication 200 MILLIGRAM(S): at 18:48

## 2020-04-09 RX ADMIN — ALBUTEROL 2 PUFF(S): 90 AEROSOL, METERED ORAL at 17:01

## 2020-04-09 RX ADMIN — AZITHROMYCIN 250 MILLIGRAM(S): 500 TABLET, FILM COATED ORAL at 14:49

## 2020-04-09 RX ADMIN — Medication 1: at 22:40

## 2020-04-09 NOTE — PROGRESS NOTE ADULT - SUBJECTIVE AND OBJECTIVE BOX
INTERVAL HPI/OVERNIGHT EVENTS:  Patient was seen and examined at bedside. As per nurse and patient, no o/n events, patient resting comfortably. No complaints at this time. Feeling better however remains on nonrebreather. Patient denies: fever, chills, dizziness, weakness, HA, Changes in vision, CP, palpitations, SOB, cough, N/V/D/C, dysuria, changes in bowel movements, LE edema. ROS otherwise negative.    VITAL SIGNS:  T(F): 97.5 (04-09-20 @ 09:03)  HR: 65 (04-09-20 @ 09:03)  BP: 122/55 (04-09-20 @ 09:03)  RR: 25 (04-09-20 @ 09:03)  SpO2: 94% (04-09-20 @ 09:03)  Wt(kg): --    PHYSICAL EXAM:    Constitutional: WDWN, NAD  HEENT: PERRL, EOMI, sclera non-icteric, neck supple, trachea midline, no masses, no JVD, MMM, good dentition  Respiratory: CTA b/l, good air entry b/l, no wheezing, no rhonchi, no rales, without accessory muscle use and no intercostal retractions  Cardiovascular: RRR, normal S1S2, no M/R/G  Gastrointestinal: soft, NTND, no masses palpable, BS normal  Extremities: Warm, well perfused, pulses equal bilateral upper and lower extremities, no edema, no clubbing  Neurological: AAOx3, CN Grossly intact  Skin: Normal temperature, warm, dry    MEDICATIONS  (STANDING):  ALBUTerol    90 MICROgram(s) HFA Inhaler 2 Puff(s) Inhalation every 6 hours  azithromycin   Tablet 250 milliGRAM(s) Oral every 24 hours  budesonide 160 MICROgram(s)/formoterol 4.5 MICROgram(s) Inhaler 2 Puff(s) Inhalation two times a day  dextrose 5%. 1000 milliLiter(s) (50 mL/Hr) IV Continuous <Continuous>  dextrose 50% Injectable 12.5 Gram(s) IV Push once  dextrose 50% Injectable 25 Gram(s) IV Push once  dextrose 50% Injectable 25 Gram(s) IV Push once  enoxaparin Injectable 40 milliGRAM(s) SubCutaneous every 24 hours  famotidine    Tablet 20 milliGRAM(s) Oral daily  hydroxychloroquine   Oral   hydroxychloroquine 200 milliGRAM(s) Oral every 12 hours  insulin lispro (HumaLOG) corrective regimen sliding scale   SubCutaneous Before meals and at bedtime  melatonin 3 milliGRAM(s) Oral at bedtime  methylPREDNISolone sodium succinate Injectable 40 milliGRAM(s) IV Push every 12 hours  sertraline 25 milliGRAM(s) Oral daily    MEDICATIONS  (PRN):  acetaminophen   Tablet .. 650 milliGRAM(s) Oral every 6 hours PRN Temp greater or equal to 38C (100.4F), Mild Pain (1 - 3)  ALPRAZolam 0.5 milliGRAM(s) Oral at bedtime PRN anxiety  aluminum hydroxide/magnesium hydroxide/simethicone Suspension 30 milliLiter(s) Oral every 6 hours PRN Dyspepsia  dextrose 40% Gel 15 Gram(s) Oral once PRN Blood Glucose LESS THAN 70 milliGRAM(s)/deciliter  glucagon  Injectable 1 milliGRAM(s) IntraMuscular once PRN Glucose LESS THAN 70 milligrams/deciliter  guaifenesin/dextromethorphan  Syrup 5 milliLiter(s) Oral every 8 hours PRN Cough      Allergies    No Known Allergies    Intolerances        LABS:                        12.8   19.94 )-----------( 455      ( 09 Apr 2020 12:58 )             40.4     04-09    140  |  100  |  17  ----------------------------<  196<H>  4.7   |  27  |  0.44<L>    Ca    9.5      09 Apr 2020 12:59  Phos  3.9     04-09  Mg     2.2     04-09    TPro  7.2  /  Alb  3.3  /  TBili  0.8  /  DBili  x   /  AST  54<H>  /  ALT  101<H>  /  AlkPhos  114  04-09      Urinalysis Basic - ( 08 Apr 2020 03:48 )    Color: Yellow / Appearance: SL Cloudy / SG: >=1.030 / pH: x  Gluc: x / Ketone: Trace mg/dL  / Bili: Negative / Urobili: 1.0 E.U./dL   Blood: x / Protein: 30 mg/dL / Nitrite: NEGATIVE   Leuk Esterase: Trace / RBC: < 5 /HPF / WBC 5-10 /HPF   Sq Epi: x / Non Sq Epi: 0-5 /HPF / Bacteria: Present /HPF        RADIOLOGY & ADDITIONAL TESTS:  Reviewed INTERVAL HPI/OVERNIGHT EVENTS:  Patient was seen and examined at bedside. As per nurse and patient, no o/n events, patient resting comfortably. No complaints at this time. Feeling better however remains on nonrebreather. Patient denies: fever, chills, dizziness, weakness, HA, Changes in vision, CP, palpitations, SOB, cough, N/V/D/C, dysuria, changes in bowel movements, LE edema. ROS otherwise negative.    VITAL SIGNS:  T(F): 97.5 (04-09-20 @ 09:03)  HR: 65 (04-09-20 @ 09:03)  BP: 122/55 (04-09-20 @ 09:03)  RR: 25 (04-09-20 @ 09:03)  SpO2: 94% (04-09-20 @ 09:03)  Wt(kg): --    PHYSICAL EXAM:    Constitutional: WDWN, NAD  HEENT: PERRL, EOMI, sclera non-icteric, neck supple, trachea midline, no masses, no JVD, MMM, good dentition  Respiratory: rhonchi posterior lung bases, without accessory muscle use and no intercostal retractions  Cardiovascular: RRR, normal S1S2, no M/R/G  Gastrointestinal: soft, NTND, no masses palpable, BS normal  Extremities: Warm, well perfused, pulses equal bilateral upper and lower extremities, no edema, no clubbing  Neurological: AAOx3, CN Grossly intact  Skin: Normal temperature, warm, dry    MEDICATIONS  (STANDING):  ALBUTerol    90 MICROgram(s) HFA Inhaler 2 Puff(s) Inhalation every 6 hours  azithromycin   Tablet 250 milliGRAM(s) Oral every 24 hours  budesonide 160 MICROgram(s)/formoterol 4.5 MICROgram(s) Inhaler 2 Puff(s) Inhalation two times a day  dextrose 5%. 1000 milliLiter(s) (50 mL/Hr) IV Continuous <Continuous>  dextrose 50% Injectable 12.5 Gram(s) IV Push once  dextrose 50% Injectable 25 Gram(s) IV Push once  dextrose 50% Injectable 25 Gram(s) IV Push once  enoxaparin Injectable 40 milliGRAM(s) SubCutaneous every 24 hours  famotidine    Tablet 20 milliGRAM(s) Oral daily  hydroxychloroquine   Oral   hydroxychloroquine 200 milliGRAM(s) Oral every 12 hours  insulin lispro (HumaLOG) corrective regimen sliding scale   SubCutaneous Before meals and at bedtime  melatonin 3 milliGRAM(s) Oral at bedtime  methylPREDNISolone sodium succinate Injectable 40 milliGRAM(s) IV Push every 12 hours  sertraline 25 milliGRAM(s) Oral daily    MEDICATIONS  (PRN):  acetaminophen   Tablet .. 650 milliGRAM(s) Oral every 6 hours PRN Temp greater or equal to 38C (100.4F), Mild Pain (1 - 3)  ALPRAZolam 0.5 milliGRAM(s) Oral at bedtime PRN anxiety  aluminum hydroxide/magnesium hydroxide/simethicone Suspension 30 milliLiter(s) Oral every 6 hours PRN Dyspepsia  dextrose 40% Gel 15 Gram(s) Oral once PRN Blood Glucose LESS THAN 70 milliGRAM(s)/deciliter  glucagon  Injectable 1 milliGRAM(s) IntraMuscular once PRN Glucose LESS THAN 70 milligrams/deciliter  guaifenesin/dextromethorphan  Syrup 5 milliLiter(s) Oral every 8 hours PRN Cough      Allergies    No Known Allergies    Intolerances        LABS:                        12.8   19.94 )-----------( 455      ( 09 Apr 2020 12:58 )             40.4     04-09    140  |  100  |  17  ----------------------------<  196<H>  4.7   |  27  |  0.44<L>    Ca    9.5      09 Apr 2020 12:59  Phos  3.9     04-09  Mg     2.2     04-09    TPro  7.2  /  Alb  3.3  /  TBili  0.8  /  DBili  x   /  AST  54<H>  /  ALT  101<H>  /  AlkPhos  114  04-09      Urinalysis Basic - ( 08 Apr 2020 03:48 )    Color: Yellow / Appearance: SL Cloudy / SG: >=1.030 / pH: x  Gluc: x / Ketone: Trace mg/dL  / Bili: Negative / Urobili: 1.0 E.U./dL   Blood: x / Protein: 30 mg/dL / Nitrite: NEGATIVE   Leuk Esterase: Trace / RBC: < 5 /HPF / WBC 5-10 /HPF   Sq Epi: x / Non Sq Epi: 0-5 /HPF / Bacteria: Present /HPF        RADIOLOGY & ADDITIONAL TESTS:  Reviewed

## 2020-04-09 NOTE — PROGRESS NOTE ADULT - PROBLEM SELECTOR PLAN 1
covid +, hypoxia requiring 15L NRB  -isolation precautions  -tylenol q6 prn  -azithro 5 d 250 (started 4/5)  -plaquenil 5 days (started 4/5)  -Monitor QTC (4/6/2020 ; EKG 4/7 , RN to complete)  -cough meds prn  -Oxygen, currently 93-96% on NRB 15L, titrate down as tolerated   -Steroids started 4/7  -monitor wbc, as it has been uptrending. Fu bcx- consider ctx if worsens/ wbc remains high for superimprosed pna. Also f/u with repeat UA/UC results covid +, hypoxia requiring 15L NRB  -isolation precautions  -tylenol q6 prn  -azithro 5 d 250 (started 4/5)  -plaquenil 5 days (started 4/5)  -Monitor QTC (4/6/2020 ; EKG 4/7 , 4/9 )  -cough meds prn  -Oxygen, currently 93-96% on NRB 5L, titrate down as tolerated   -Steroids started 4/7  -toci 4/7  -monitor wbc, as it has been uptrending. Fu bcx- consider ctx if worsens/ wbc remains high for superimprosed pna. Also f/u with repeat UA/UC results covid +, hypoxia requiring 15L NRB, now weaning down to 7L  -isolation precautions  -tylenol q6 prn  -azithro 5 d 250 (started 4/5)  -plaquenil 5 days (started 4/5)  -Monitor QTC (4/6/2020 ; EKG 4/7 , 4/9 )  -cough meds prn  -Oxygen, currently 93-96% on NC 7L  -Steroids started 4/7  -toci 4/7  -monitor wbc, as it has been uptrending. Fu bcx- consider ctx if worsens/ wbc remains high for superimprosed pna. Also f/u with repeat UA/UC results

## 2020-04-09 NOTE — PROGRESS NOTE ADULT - ASSESSMENT
74 F former smoker, PMH asthma (never intubated, ran out of albuterol), HTN, HLD, DM p/w 5 days SOB/ cough, admitted for hypoxia 2/2 COVID (+). She is intermittently anxious reporting insomnia; xanax is being given PRN. Attempts to wean off of the NRB have been unsuccessful and her COVID labs are trending up; given this, steroids were started on 4/7; Tocilizumab also started 4/7. 74 F former smoker, PMH asthma (never intubated, ran out of albuterol), HTN, HLD, DM p/w 5 days SOB/ cough, admitted for hypoxia 2/2 COVID (+). She is intermittently anxious reporting insomnia; xanax is being given PRN. Attempts to wean off of the NRB have been unsuccessful and her COVID labs are trending up; given this, steroids were started on 4/7; Tocilizumab also started 4/7. She has now weaned to NRB 5L, improving CRP, worsening WBC (possibly 2/2 steroids).

## 2020-04-10 DIAGNOSIS — D72.829 ELEVATED WHITE BLOOD CELL COUNT, UNSPECIFIED: ICD-10-CM

## 2020-04-10 LAB
-  CEFTRIAXONE: SIGNIFICANT CHANGE UP
-  CLINDAMYCIN: SIGNIFICANT CHANGE UP
-  ERYTHROMYCIN: SIGNIFICANT CHANGE UP
-  PENICILLIN: SIGNIFICANT CHANGE UP
-  VANCOMYCIN: SIGNIFICANT CHANGE UP
ALBUMIN SERPL ELPH-MCNC: 3.4 G/DL — SIGNIFICANT CHANGE UP (ref 3.3–5)
ALP SERPL-CCNC: 104 U/L — SIGNIFICANT CHANGE UP (ref 40–120)
ALT FLD-CCNC: 88 U/L — HIGH (ref 10–45)
ANION GAP SERPL CALC-SCNC: 13 MMOL/L — SIGNIFICANT CHANGE UP (ref 5–17)
AST SERPL-CCNC: 35 U/L — SIGNIFICANT CHANGE UP (ref 10–40)
BASOPHILS # BLD AUTO: 0.03 K/UL — SIGNIFICANT CHANGE UP (ref 0–0.2)
BASOPHILS NFR BLD AUTO: 0.2 % — SIGNIFICANT CHANGE UP (ref 0–2)
BILIRUB SERPL-MCNC: 1 MG/DL — SIGNIFICANT CHANGE UP (ref 0.2–1.2)
BUN SERPL-MCNC: 15 MG/DL — SIGNIFICANT CHANGE UP (ref 7–23)
CALCIUM SERPL-MCNC: 9 MG/DL — SIGNIFICANT CHANGE UP (ref 8.4–10.5)
CHLORIDE SERPL-SCNC: 98 MMOL/L — SIGNIFICANT CHANGE UP (ref 96–108)
CO2 SERPL-SCNC: 28 MMOL/L — SIGNIFICANT CHANGE UP (ref 22–31)
CREAT SERPL-MCNC: 0.44 MG/DL — LOW (ref 0.5–1.3)
CRP SERPL-MCNC: 3.06 MG/DL — HIGH (ref 0–0.4)
CULTURE RESULTS: SIGNIFICANT CHANGE UP
D DIMER BLD IA.RAPID-MCNC: 364 NG/ML DDU — HIGH
EOSINOPHIL # BLD AUTO: 0.01 K/UL — SIGNIFICANT CHANGE UP (ref 0–0.5)
EOSINOPHIL NFR BLD AUTO: 0.1 % — SIGNIFICANT CHANGE UP (ref 0–6)
FERRITIN SERPL-MCNC: 901 NG/ML — HIGH (ref 15–150)
GLUCOSE BLDC GLUCOMTR-MCNC: 114 MG/DL — HIGH (ref 70–99)
GLUCOSE BLDC GLUCOMTR-MCNC: 169 MG/DL — HIGH (ref 70–99)
GLUCOSE BLDC GLUCOMTR-MCNC: 200 MG/DL — HIGH (ref 70–99)
GLUCOSE BLDC GLUCOMTR-MCNC: 244 MG/DL — HIGH (ref 70–99)
GLUCOSE SERPL-MCNC: 109 MG/DL — HIGH (ref 70–99)
HCT VFR BLD CALC: 42.3 % — SIGNIFICANT CHANGE UP (ref 34.5–45)
HGB BLD-MCNC: 13.6 G/DL — SIGNIFICANT CHANGE UP (ref 11.5–15.5)
IMM GRANULOCYTES NFR BLD AUTO: 1.1 % — SIGNIFICANT CHANGE UP (ref 0–1.5)
LYMPHOCYTES # BLD AUTO: 1.24 K/UL — SIGNIFICANT CHANGE UP (ref 1–3.3)
LYMPHOCYTES # BLD AUTO: 7.5 % — LOW (ref 13–44)
MAGNESIUM SERPL-MCNC: 2 MG/DL — SIGNIFICANT CHANGE UP (ref 1.6–2.6)
MCHC RBC-ENTMCNC: 28.1 PG — SIGNIFICANT CHANGE UP (ref 27–34)
MCHC RBC-ENTMCNC: 32.2 GM/DL — SIGNIFICANT CHANGE UP (ref 32–36)
MCV RBC AUTO: 87.4 FL — SIGNIFICANT CHANGE UP (ref 80–100)
METHOD TYPE: SIGNIFICANT CHANGE UP
MONOCYTES # BLD AUTO: 0.73 K/UL — SIGNIFICANT CHANGE UP (ref 0–0.9)
MONOCYTES NFR BLD AUTO: 4.4 % — SIGNIFICANT CHANGE UP (ref 2–14)
NEUTROPHILS # BLD AUTO: 14.34 K/UL — HIGH (ref 1.8–7.4)
NEUTROPHILS NFR BLD AUTO: 86.7 % — HIGH (ref 43–77)
NRBC # BLD: 0 /100 WBCS — SIGNIFICANT CHANGE UP (ref 0–0)
ORGANISM # SPEC MICROSCOPIC CNT: SIGNIFICANT CHANGE UP
PHOSPHATE SERPL-MCNC: 3.9 MG/DL — SIGNIFICANT CHANGE UP (ref 2.5–4.5)
PLATELET # BLD AUTO: 473 K/UL — HIGH (ref 150–400)
POTASSIUM SERPL-MCNC: 4 MMOL/L — SIGNIFICANT CHANGE UP (ref 3.5–5.3)
POTASSIUM SERPL-SCNC: 4 MMOL/L — SIGNIFICANT CHANGE UP (ref 3.5–5.3)
PROT SERPL-MCNC: 6.8 G/DL — SIGNIFICANT CHANGE UP (ref 6–8.3)
RBC # BLD: 4.84 M/UL — SIGNIFICANT CHANGE UP (ref 3.8–5.2)
RBC # FLD: 13.2 % — SIGNIFICANT CHANGE UP (ref 10.3–14.5)
SODIUM SERPL-SCNC: 139 MMOL/L — SIGNIFICANT CHANGE UP (ref 135–145)
SPECIMEN SOURCE: SIGNIFICANT CHANGE UP
WBC # BLD: 16.54 K/UL — HIGH (ref 3.8–10.5)
WBC # FLD AUTO: 16.54 K/UL — HIGH (ref 3.8–10.5)

## 2020-04-10 PROCEDURE — 71045 X-RAY EXAM CHEST 1 VIEW: CPT | Mod: 26

## 2020-04-10 PROCEDURE — 99232 SBSQ HOSP IP/OBS MODERATE 35: CPT

## 2020-04-10 RX ORDER — FUROSEMIDE 40 MG
40 TABLET ORAL ONCE
Refills: 0 | Status: COMPLETED | OUTPATIENT
Start: 2020-04-10 | End: 2020-04-10

## 2020-04-10 RX ORDER — AMLODIPINE BESYLATE 2.5 MG/1
5 TABLET ORAL DAILY
Refills: 0 | Status: DISCONTINUED | OUTPATIENT
Start: 2020-04-10 | End: 2020-04-13

## 2020-04-10 RX ORDER — AMLODIPINE BESYLATE 2.5 MG/1
5 TABLET ORAL ONCE
Refills: 0 | Status: COMPLETED | OUTPATIENT
Start: 2020-04-10 | End: 2020-04-10

## 2020-04-10 RX ADMIN — ALBUTEROL 2 PUFF(S): 90 AEROSOL, METERED ORAL at 05:45

## 2020-04-10 RX ADMIN — Medication 2: at 08:50

## 2020-04-10 RX ADMIN — SERTRALINE 25 MILLIGRAM(S): 25 TABLET, FILM COATED ORAL at 08:51

## 2020-04-10 RX ADMIN — Medication 3 MILLIGRAM(S): at 21:21

## 2020-04-10 RX ADMIN — FAMOTIDINE 20 MILLIGRAM(S): 10 INJECTION INTRAVENOUS at 08:50

## 2020-04-10 RX ADMIN — Medication 40 MILLIGRAM(S): at 15:43

## 2020-04-10 RX ADMIN — Medication 1: at 22:10

## 2020-04-10 RX ADMIN — ALBUTEROL 2 PUFF(S): 90 AEROSOL, METERED ORAL at 17:20

## 2020-04-10 RX ADMIN — AMLODIPINE BESYLATE 5 MILLIGRAM(S): 2.5 TABLET ORAL at 17:19

## 2020-04-10 RX ADMIN — BUDESONIDE AND FORMOTEROL FUMARATE DIHYDRATE 2 PUFF(S): 160; 4.5 AEROSOL RESPIRATORY (INHALATION) at 22:00

## 2020-04-10 RX ADMIN — Medication 40 MILLIGRAM(S): at 17:19

## 2020-04-10 RX ADMIN — Medication 200 MILLIGRAM(S): at 05:33

## 2020-04-10 RX ADMIN — BUDESONIDE AND FORMOTEROL FUMARATE DIHYDRATE 2 PUFF(S): 160; 4.5 AEROSOL RESPIRATORY (INHALATION) at 08:49

## 2020-04-10 RX ADMIN — ALBUTEROL 2 PUFF(S): 90 AEROSOL, METERED ORAL at 08:49

## 2020-04-10 RX ADMIN — Medication 40 MILLIGRAM(S): at 05:45

## 2020-04-10 RX ADMIN — ENOXAPARIN SODIUM 40 MILLIGRAM(S): 100 INJECTION SUBCUTANEOUS at 21:21

## 2020-04-10 RX ADMIN — Medication 1: at 11:52

## 2020-04-10 NOTE — PROGRESS NOTE ADULT - PROBLEM SELECTOR PLAN 1
covid +, hypoxia requiring up to 15L NRB, now weaning down to 7L  -isolation precautions  -tylenol q6 prn  -azithro completed 4/9  -plaquenil completed 4/9  -cough meds prn  -Oxygen, currently 95% on 8L NRB  -Steroids started 4/7  -toci 4/7  -continue IS

## 2020-04-10 NOTE — PROGRESS NOTE ADULT - SUBJECTIVE AND OBJECTIVE BOX
INTERVAL HPI/OVERNIGHT EVENTS:  Patient was seen and examined at bedside. Patient complains of difficulty sleeping last night. Replaced on nonrebreather over night for desaturation to 80s while on NC. Patient denies: fever, chills, dizziness, weakness, HA, Changes in vision, CP, palpitations, SOB, cough, N/V/D/C, dysuria, changes in bowel movements, LE edema. ROS otherwise negative.    Vital Signs Last 24 Hrs  T(C): 36.6 (10 Apr 2020 06:38), Max: 36.7 (09 Apr 2020 23:03)  T(F): 97.9 (10 Apr 2020 06:38), Max: 98 (09 Apr 2020 23:03)  HR: 52 (10 Apr 2020 08:36) (52 - 55)  BP: 139/52 (10 Apr 2020 08:36) (139/52 - 170/53)  BP(mean): 81 (10 Apr 2020 08:36) (81 - 81)  RR: 24 (10 Apr 2020 08:36) (18 - 24)  SpO2: 95% (10 Apr 2020 08:36) (86% - 96%)    PHYSICAL EXAM:    Constitutional: WDWN, NAD  HEENT: PERRL, EOMI, sclera non-icteric, neck supple, trachea midline, no masses, no JVD, MMM, good dentition  Respiratory: rhonchi to posterior lung bases, without accessory muscle use and no intercostal retractions  Cardiovascular: RRR, normal S1S2  Gastrointestinal: soft, NTND, no masses palpable, BS normal  Extremities: Warm, well perfused, pulses equal bilateral upper and lower extremities, no edema, no clubbing  Neurological: AAOx3  Skin: Normal temperature, warm, dry    MEDICATIONS  (STANDING):  ALBUTerol    90 MICROgram(s) HFA Inhaler 2 Puff(s) Inhalation every 6 hours  azithromycin   Tablet 250 milliGRAM(s) Oral every 24 hours  budesonide 160 MICROgram(s)/formoterol 4.5 MICROgram(s) Inhaler 2 Puff(s) Inhalation two times a day  dextrose 5%. 1000 milliLiter(s) (50 mL/Hr) IV Continuous <Continuous>  dextrose 50% Injectable 12.5 Gram(s) IV Push once  dextrose 50% Injectable 25 Gram(s) IV Push once  dextrose 50% Injectable 25 Gram(s) IV Push once  enoxaparin Injectable 40 milliGRAM(s) SubCutaneous every 24 hours  famotidine    Tablet 20 milliGRAM(s) Oral daily  hydroxychloroquine   Oral   hydroxychloroquine 200 milliGRAM(s) Oral every 12 hours  insulin lispro (HumaLOG) corrective regimen sliding scale   SubCutaneous Before meals and at bedtime  melatonin 3 milliGRAM(s) Oral at bedtime  methylPREDNISolone sodium succinate Injectable 40 milliGRAM(s) IV Push every 12 hours  sertraline 25 milliGRAM(s) Oral daily    MEDICATIONS  (PRN):  acetaminophen   Tablet .. 650 milliGRAM(s) Oral every 6 hours PRN Temp greater or equal to 38C (100.4F), Mild Pain (1 - 3)  ALPRAZolam 0.5 milliGRAM(s) Oral at bedtime PRN anxiety  aluminum hydroxide/magnesium hydroxide/simethicone Suspension 30 milliLiter(s) Oral every 6 hours PRN Dyspepsia  dextrose 40% Gel 15 Gram(s) Oral once PRN Blood Glucose LESS THAN 70 milliGRAM(s)/deciliter  glucagon  Injectable 1 milliGRAM(s) IntraMuscular once PRN Glucose LESS THAN 70 milligrams/deciliter  guaifenesin/dextromethorphan  Syrup 5 milliLiter(s) Oral every 8 hours PRN Cough      Allergies    No Known Allergies    Intolerances        LABS:                        12.8   19.94 )-----------( 455      ( 09 Apr 2020 12:58 )             40.4     04-09    140  |  100  |  17  ----------------------------<  196<H>  4.7   |  27  |  0.44<L>    Ca    9.5      09 Apr 2020 12:59  Phos  3.9     04-09  Mg     2.2     04-09    TPro  7.2  /  Alb  3.3  /  TBili  0.8  /  DBili  x   /  AST  54<H>  /  ALT  101<H>  /  AlkPhos  114  04-09      Urinalysis Basic - ( 08 Apr 2020 03:48 )    Color: Yellow / Appearance: SL Cloudy / SG: >=1.030 / pH: x  Gluc: x / Ketone: Trace mg/dL  / Bili: Negative / Urobili: 1.0 E.U./dL   Blood: x / Protein: 30 mg/dL / Nitrite: NEGATIVE   Leuk Esterase: Trace / RBC: < 5 /HPF / WBC 5-10 /HPF   Sq Epi: x / Non Sq Epi: 0-5 /HPF / Bacteria: Present /HPF        RADIOLOGY & ADDITIONAL TESTS:  Reviewed INTERVAL HPI/OVERNIGHT EVENTS:  Patient was seen and examined at bedside. Patient complains of difficulty sleeping last night. Replaced on nonrebreather over night for desaturation to 80s while on NC. Patient denies: fever, chills, dizziness, weakness, HA, Changes in vision, CP, palpitations, SOB, cough, N/V/D/C, dysuria, changes in bowel movements, LE edema. ROS otherwise negative.    Vital Signs Last 24 Hrs  T(C): 36.6 (10 Apr 2020 06:38), Max: 36.7 (09 Apr 2020 23:03)  T(F): 97.9 (10 Apr 2020 06:38), Max: 98 (09 Apr 2020 23:03)  HR: 52 (10 Apr 2020 08:36) (52 - 55)  BP: 139/52 (10 Apr 2020 08:36) (139/52 - 170/53)  BP(mean): 81 (10 Apr 2020 08:36) (81 - 81)  RR: 24 (10 Apr 2020 08:36) (18 - 24)  SpO2: 95% (10 Apr 2020 08:36) (86% - 96%)    PHYSICAL EXAM:    Constitutional: WDWN, NAD  HEENT: PERRL, EOMI, sclera non-icteric, neck supple, trachea midline, no masses, no JVD, MMM, good dentition  Respiratory: rhonchi to posterior lung bases, without accessory muscle use and no intercostal retractions  Cardiovascular: RRR, normal S1S2  Gastrointestinal: soft, NTND, no masses palpable, BS normal  Extremities: Warm, well perfused, pulses equal bilateral upper and lower extremities, no edema, no clubbing  Neurological: AAOx3  Skin: Normal temperature, warm, dry    MEDICATIONS  (STANDING):  ALBUTerol    90 MICROgram(s) HFA Inhaler 2 Puff(s) Inhalation every 6 hours  azithromycin   Tablet 250 milliGRAM(s) Oral every 24 hours  budesonide 160 MICROgram(s)/formoterol 4.5 MICROgram(s) Inhaler 2 Puff(s) Inhalation two times a day  dextrose 5%. 1000 milliLiter(s) (50 mL/Hr) IV Continuous <Continuous>  dextrose 50% Injectable 12.5 Gram(s) IV Push once  dextrose 50% Injectable 25 Gram(s) IV Push once  dextrose 50% Injectable 25 Gram(s) IV Push once  enoxaparin Injectable 40 milliGRAM(s) SubCutaneous every 24 hours  famotidine    Tablet 20 milliGRAM(s) Oral daily  hydroxychloroquine   Oral   hydroxychloroquine 200 milliGRAM(s) Oral every 12 hours  insulin lispro (HumaLOG) corrective regimen sliding scale   SubCutaneous Before meals and at bedtime  melatonin 3 milliGRAM(s) Oral at bedtime  methylPREDNISolone sodium succinate Injectable 40 milliGRAM(s) IV Push every 12 hours  sertraline 25 milliGRAM(s) Oral daily    MEDICATIONS  (PRN):  acetaminophen   Tablet .. 650 milliGRAM(s) Oral every 6 hours PRN Temp greater or equal to 38C (100.4F), Mild Pain (1 - 3)  ALPRAZolam 0.5 milliGRAM(s) Oral at bedtime PRN anxiety  aluminum hydroxide/magnesium hydroxide/simethicone Suspension 30 milliLiter(s) Oral every 6 hours PRN Dyspepsia  dextrose 40% Gel 15 Gram(s) Oral once PRN Blood Glucose LESS THAN 70 milliGRAM(s)/deciliter  glucagon  Injectable 1 milliGRAM(s) IntraMuscular once PRN Glucose LESS THAN 70 milligrams/deciliter  guaifenesin/dextromethorphan  Syrup 5 milliLiter(s) Oral every 8 hours PRN Cough      Allergies    No Known Allergies    Intolerances        LABS:    D Dimer 476>364  CRP 18.4>5>3  Ferritin 1071> 1045> 901             04-10    139  |  98  |  15  ----------------------------<  109<H>  4.0   |  28  |  0.44<L>    Ca    9.0      10 Apr 2020 06:55  Phos  3.9     04-10  Mg     2.0     04-10    TPro  6.8  /  Alb  3.4  /  TBili  1.0  /  DBili  x   /  AST  35  /  ALT  88<H>  /  AlkPhos  104  04-10                          13.6   16.54 )-----------( 473      ( 10 Apr 2020 06:55 )             42.3               Urinalysis Basic - ( 08 Apr 2020 03:48 )    Color: Yellow / Appearance: SL Cloudy / SG: >=1.030 / pH: x  Gluc: x / Ketone: Trace mg/dL  / Bili: Negative / Urobili: 1.0 E.U./dL   Blood: x / Protein: 30 mg/dL / Nitrite: NEGATIVE   Leuk Esterase: Trace / RBC: < 5 /HPF / WBC 5-10 /HPF   Sq Epi: x / Non Sq Epi: 0-5 /HPF / Bacteria: Present /HPF        RADIOLOGY & ADDITIONAL TESTS:  Reviewed

## 2020-04-10 NOTE — PROGRESS NOTE ADULT - PROBLEM SELECTOR PLAN 2
-monitor wbc, as it has been uptrending.  blood cx NGTF  urine cx pending -monitor wbc, as it has been uptrending.  blood cx NGTD  urine cx strep gallolyticus

## 2020-04-10 NOTE — PROGRESS NOTE ADULT - ASSESSMENT
74 F former smoker, PMH asthma (never intubated, ran out of albuterol), HTN, HLD, DM p/w 5 days SOB/ cough, admitted for hypoxia 2/2 COVID (+). She is intermittently anxious reporting insomnia; xanax is being given PRN. Attempts to wean off of the NRB have been unsuccessful and her COVID labs are trending up; given this, steroids were started on 4/7; Tocilizumab also started 4/7. She has now weaned to NRB 5L, improving CRP, stable elevated WBC (possibly 2/2 steroids).

## 2020-04-11 LAB
ALBUMIN SERPL ELPH-MCNC: 3.6 G/DL — SIGNIFICANT CHANGE UP (ref 3.3–5)
ALP SERPL-CCNC: 85 U/L — SIGNIFICANT CHANGE UP (ref 40–120)
ALT FLD-CCNC: 87 U/L — HIGH (ref 10–45)
ANION GAP SERPL CALC-SCNC: 17 MMOL/L — SIGNIFICANT CHANGE UP (ref 5–17)
AST SERPL-CCNC: 33 U/L — SIGNIFICANT CHANGE UP (ref 10–40)
BASOPHILS # BLD AUTO: 0 K/UL — SIGNIFICANT CHANGE UP (ref 0–0.2)
BASOPHILS NFR BLD AUTO: 0 % — SIGNIFICANT CHANGE UP (ref 0–2)
BILIRUB SERPL-MCNC: 1.2 MG/DL — SIGNIFICANT CHANGE UP (ref 0.2–1.2)
BUN SERPL-MCNC: 22 MG/DL — SIGNIFICANT CHANGE UP (ref 7–23)
CALCIUM SERPL-MCNC: 9.8 MG/DL — SIGNIFICANT CHANGE UP (ref 8.4–10.5)
CHLORIDE SERPL-SCNC: 91 MMOL/L — LOW (ref 96–108)
CO2 SERPL-SCNC: 26 MMOL/L — SIGNIFICANT CHANGE UP (ref 22–31)
CREAT SERPL-MCNC: 0.49 MG/DL — LOW (ref 0.5–1.3)
CRP SERPL-MCNC: 1.57 MG/DL — HIGH (ref 0–0.4)
D DIMER BLD IA.RAPID-MCNC: 389 NG/ML DDU — HIGH
EOSINOPHIL # BLD AUTO: 0 K/UL — SIGNIFICANT CHANGE UP (ref 0–0.5)
EOSINOPHIL NFR BLD AUTO: 0 % — SIGNIFICANT CHANGE UP (ref 0–6)
FERRITIN SERPL-MCNC: 958 NG/ML — HIGH (ref 15–150)
GLUCOSE BLDC GLUCOMTR-MCNC: 124 MG/DL — HIGH (ref 70–99)
GLUCOSE BLDC GLUCOMTR-MCNC: 153 MG/DL — HIGH (ref 70–99)
GLUCOSE BLDC GLUCOMTR-MCNC: 161 MG/DL — HIGH (ref 70–99)
GLUCOSE BLDC GLUCOMTR-MCNC: 189 MG/DL — HIGH (ref 70–99)
GLUCOSE SERPL-MCNC: 228 MG/DL — HIGH (ref 70–99)
HCT VFR BLD CALC: 45.4 % — HIGH (ref 34.5–45)
HGB BLD-MCNC: 14.3 G/DL — SIGNIFICANT CHANGE UP (ref 11.5–15.5)
LYMPHOCYTES # BLD AUTO: 1.05 K/UL — SIGNIFICANT CHANGE UP (ref 1–3.3)
LYMPHOCYTES # BLD AUTO: 7 % — LOW (ref 13–44)
MAGNESIUM SERPL-MCNC: 2.1 MG/DL — SIGNIFICANT CHANGE UP (ref 1.6–2.6)
MCHC RBC-ENTMCNC: 27.5 PG — SIGNIFICANT CHANGE UP (ref 27–34)
MCHC RBC-ENTMCNC: 31.5 GM/DL — LOW (ref 32–36)
MCV RBC AUTO: 87.3 FL — SIGNIFICANT CHANGE UP (ref 80–100)
MONOCYTES # BLD AUTO: 0.39 K/UL — SIGNIFICANT CHANGE UP (ref 0–0.9)
MONOCYTES NFR BLD AUTO: 2.6 % — SIGNIFICANT CHANGE UP (ref 2–14)
NEUTROPHILS # BLD AUTO: 12.94 K/UL — HIGH (ref 1.8–7.4)
NEUTROPHILS NFR BLD AUTO: 86.1 % — HIGH (ref 43–77)
PHOSPHATE SERPL-MCNC: 4 MG/DL — SIGNIFICANT CHANGE UP (ref 2.5–4.5)
PLATELET # BLD AUTO: 456 K/UL — HIGH (ref 150–400)
POTASSIUM SERPL-MCNC: 3.9 MMOL/L — SIGNIFICANT CHANGE UP (ref 3.5–5.3)
POTASSIUM SERPL-SCNC: 3.9 MMOL/L — SIGNIFICANT CHANGE UP (ref 3.5–5.3)
PROT SERPL-MCNC: 7 G/DL — SIGNIFICANT CHANGE UP (ref 6–8.3)
RBC # BLD: 5.2 M/UL — SIGNIFICANT CHANGE UP (ref 3.8–5.2)
RBC # FLD: 13.4 % — SIGNIFICANT CHANGE UP (ref 10.3–14.5)
SODIUM SERPL-SCNC: 134 MMOL/L — LOW (ref 135–145)
WBC # BLD: 15.03 K/UL — HIGH (ref 3.8–10.5)
WBC # FLD AUTO: 15.03 K/UL — HIGH (ref 3.8–10.5)

## 2020-04-11 PROCEDURE — 99232 SBSQ HOSP IP/OBS MODERATE 35: CPT

## 2020-04-11 RX ORDER — CEFTRIAXONE 500 MG/1
1000 INJECTION, POWDER, FOR SOLUTION INTRAMUSCULAR; INTRAVENOUS EVERY 24 HOURS
Refills: 0 | Status: DISCONTINUED | OUTPATIENT
Start: 2020-04-11 | End: 2020-04-13

## 2020-04-11 RX ORDER — ATORVASTATIN CALCIUM 80 MG/1
20 TABLET, FILM COATED ORAL AT BEDTIME
Refills: 0 | Status: DISCONTINUED | OUTPATIENT
Start: 2020-04-11 | End: 2020-04-13

## 2020-04-11 RX ORDER — FUROSEMIDE 40 MG
40 TABLET ORAL ONCE
Refills: 0 | Status: COMPLETED | OUTPATIENT
Start: 2020-04-11 | End: 2020-04-11

## 2020-04-11 RX ADMIN — Medication 40 MILLIGRAM(S): at 18:42

## 2020-04-11 RX ADMIN — ATORVASTATIN CALCIUM 20 MILLIGRAM(S): 80 TABLET, FILM COATED ORAL at 21:58

## 2020-04-11 RX ADMIN — ALBUTEROL 2 PUFF(S): 90 AEROSOL, METERED ORAL at 10:32

## 2020-04-11 RX ADMIN — ALBUTEROL 2 PUFF(S): 90 AEROSOL, METERED ORAL at 22:00

## 2020-04-11 RX ADMIN — Medication 1: at 21:51

## 2020-04-11 RX ADMIN — ALBUTEROL 2 PUFF(S): 90 AEROSOL, METERED ORAL at 06:00

## 2020-04-11 RX ADMIN — Medication 3 MILLIGRAM(S): at 21:58

## 2020-04-11 RX ADMIN — Medication 40 MILLIGRAM(S): at 05:56

## 2020-04-11 RX ADMIN — Medication 1: at 12:39

## 2020-04-11 RX ADMIN — Medication 40 MILLIGRAM(S): at 15:05

## 2020-04-11 RX ADMIN — BUDESONIDE AND FORMOTEROL FUMARATE DIHYDRATE 2 PUFF(S): 160; 4.5 AEROSOL RESPIRATORY (INHALATION) at 10:32

## 2020-04-11 RX ADMIN — ENOXAPARIN SODIUM 40 MILLIGRAM(S): 100 INJECTION SUBCUTANEOUS at 21:58

## 2020-04-11 RX ADMIN — ALBUTEROL 2 PUFF(S): 90 AEROSOL, METERED ORAL at 18:04

## 2020-04-11 RX ADMIN — AMLODIPINE BESYLATE 5 MILLIGRAM(S): 2.5 TABLET ORAL at 05:56

## 2020-04-11 RX ADMIN — Medication 0.25 MILLIGRAM(S): at 21:58

## 2020-04-11 RX ADMIN — CEFTRIAXONE 100 MILLIGRAM(S): 500 INJECTION, POWDER, FOR SOLUTION INTRAMUSCULAR; INTRAVENOUS at 15:05

## 2020-04-11 RX ADMIN — ALBUTEROL 2 PUFF(S): 90 AEROSOL, METERED ORAL at 00:17

## 2020-04-11 RX ADMIN — Medication 1: at 16:41

## 2020-04-11 RX ADMIN — BUDESONIDE AND FORMOTEROL FUMARATE DIHYDRATE 2 PUFF(S): 160; 4.5 AEROSOL RESPIRATORY (INHALATION) at 21:59

## 2020-04-11 RX ADMIN — FAMOTIDINE 20 MILLIGRAM(S): 10 INJECTION INTRAVENOUS at 12:31

## 2020-04-11 RX ADMIN — SERTRALINE 25 MILLIGRAM(S): 25 TABLET, FILM COATED ORAL at 12:30

## 2020-04-11 NOTE — PROGRESS NOTE ADULT - PROBLEM SELECTOR PLAN 1
covid +, hypoxia requiring up to 15L NRB, now weaning down to 7L  -isolation precautions  -tylenol q6 prn  -azithro completed 4/9  -plaquenil completed 4/9  -cough meds prn  -Oxygen, currently 99% on 7L NRB  -Steroids started 4/7  -toci 4/7  -continue IS  -Consider another 40mg Lasix dose if unable to wean down on O2

## 2020-04-11 NOTE — PROGRESS NOTE ADULT - SUBJECTIVE AND OBJECTIVE BOX
Internal Medicine ACP COVID Adult Progress Note    CC: 74 F former smoker, PMH asthma (never intubated, ran out of albuterol), HTN, HLD, DM p/w 5 days of SOB. Pt w/ 5 days of SOB, cough, improved w/ albuterol but ran out of albuterol day before admission. No fevers, endorses chills, no nv/d. No sick contacts but daughter is nurse and pt went to [facility] in jan. Last asthma attack in dec, never got steroids or ER visit, just montelukast    Subjective Assessment/Interval HPI: Denies overnight events, slept well, good breakfast, denies SOB wheeze coughing, denies abd pain, n/v/d.    O2 Sat: 99%  On Oxygen?: 7L NRB    	  MEDICATIONS:  amLODIPine   Tablet 5 milliGRAM(s) Oral daily  ALBUTerol    90 MICROgram(s) HFA Inhaler 2 Puff(s) Inhalation every 6 hours  budesonide 160 MICROgram(s)/formoterol 4.5 MICROgram(s) Inhaler 2 Puff(s) Inhalation two times a day  guaifenesin/dextromethorphan  Syrup 5 milliLiter(s) Oral every 8 hours PRN  acetaminophen   Tablet .. 650 milliGRAM(s) Oral every 6 hours PRN  ALPRAZolam 0.25 milliGRAM(s) Oral at bedtime PRN  melatonin 3 milliGRAM(s) Oral at bedtime  sertraline 25 milliGRAM(s) Oral daily  aluminum hydroxide/magnesium hydroxide/simethicone Suspension 30 milliLiter(s) Oral every 6 hours PRN  famotidine    Tablet 20 milliGRAM(s) Oral daily  dextrose 40% Gel 15 Gram(s) Oral once PRN  dextrose 50% Injectable 12.5 Gram(s) IV Push once  dextrose 50% Injectable 25 Gram(s) IV Push once  dextrose 50% Injectable 25 Gram(s) IV Push once  glucagon  Injectable 1 milliGRAM(s) IntraMuscular once PRN  insulin lispro (HumaLOG) corrective regimen sliding scale   SubCutaneous Before meals and at bedtime  methylPREDNISolone sodium succinate Injectable 40 milliGRAM(s) IV Push every 12 hours  dextrose 5%. 1000 milliLiter(s) IV Continuous <Continuous>  enoxaparin Injectable 40 milliGRAM(s) SubCutaneous every 24 hours      	  TELEMETRY:     T(C): 36.6 (04-11-20 @ 05:36), Max: 36.6 (04-11-20 @ 05:36)  HR: 53 (04-11-20 @ 05:36) (53 - 58)  BP: 121/64 (04-11-20 @ 05:36) (110/61 - 123/63)  RR: 20 (04-11-20 @ 05:36) (18 - 24)  SpO2: 96% (04-11-20 @ 05:36) (96% - 100%)  Wt(kg): --  I&O's Summary        DIAGNOSTIC TESTING:    LABS:	 	                        14.3   15.03 )-----------( 456      ( 11 Apr 2020 10:06 )             45.4     04-10    139  |  98  |  15  ----------------------------<  109<H>  4.0   |  28  |  0.44<L>    Ca    9.0      10 Apr 2020 06:55  Phos  3.9     04-10  Mg     2.0     04-10    TPro  6.8  /  Alb  3.4  /  TBili  1.0  /  DBili  x   /  AST  35  /  ALT  88<H>  /  AlkPhos  104  04-10    proBNP:   Lipid Profile:   HgA1c:   TSH:       COVID SPECIFIC LABS  Auto Neutrophil %: 86.1 % <H> [43.0 - 77.0] (04-11-20 @ 10:06)  D-Dimer Assay, Quantitative: 389 ng/mL DDU <H> (04-11-20 @ 10:06)  Auto Neutrophil %: 86.7 % <H> [43.0 - 77.0] (04-10-20 @ 06:55)  Aspartate Aminotransferase (AST/SGOT): 35 U/L [10 - 40] (04-10-20 @ 06:55)  Alanine Aminotransferase (ALT/SGPT): 88 U/L <H> [10 - 45] (04-10-20 @ 06:55)  Ferritin, Serum: 901 ng/mL <H> [15 - 150] (04-10-20 @ 06:55)  D-Dimer Assay, Quantitative: 364 ng/mL DDU <H> (04-10-20 @ 06:55)  C-Reactive Protein, Serum: 3.06 mg/dL <H> [0.00 - 0.40] (04-10-20 @ 06:55)  Aspartate Aminotransferase (AST/SGOT): 54 U/L <H> [10 - 40] (04-09-20 @ 12:59)  Alanine Aminotransferase (ALT/SGPT): 101 U/L <H> [10 - 45] (04-09-20 @ 12:59)  C-Reactive Protein, Serum: 5.61 mg/dL <H> [0.00 - 0.40] (04-09-20 @ 12:59)  Ferritin, Serum: 1045 ng/mL <H> [15 - 150] (04-09-20 @ 12:59)  Auto Neutrophil %: 94.8 % <H> [43.0 - 77.0] (04-09-20 @ 12:58)  D-Dimer Assay, Quantitative: 476 ng/mL DDU <H> (04-09-20 @ 12:58)  D-Dimer Assay, Quantitative: 556 ng/mL DDU <H> (04-08-20 @ 08:40)  C-Reactive Protein, Serum: 18.46 mg/dL <H> [0.00 - 0.40] (04-08-20 @ 08:40)  Auto Neutrophil %: 91.3 % <H> [43.0 - 77.0] (04-08-20 @ 08:40)  Aspartate Aminotransferase (AST/SGOT): 47 U/L <H> [10 - 40] (04-08-20 @ 08:40)  Alanine Aminotransferase (ALT/SGPT): 54 U/L <H> [10 - 45] (04-08-20 @ 08:40)  Ferritin, Serum: 1071 ng/mL <H> [15 - 150] (04-08-20 @ 08:40)

## 2020-04-12 DIAGNOSIS — N39.0 URINARY TRACT INFECTION, SITE NOT SPECIFIED: ICD-10-CM

## 2020-04-12 LAB
ALBUMIN SERPL ELPH-MCNC: 3.7 G/DL — SIGNIFICANT CHANGE UP (ref 3.3–5)
ALP SERPL-CCNC: 89 U/L — SIGNIFICANT CHANGE UP (ref 40–120)
ALT FLD-CCNC: 90 U/L — HIGH (ref 10–45)
ANION GAP SERPL CALC-SCNC: 15 MMOL/L — SIGNIFICANT CHANGE UP (ref 5–17)
AST SERPL-CCNC: 30 U/L — SIGNIFICANT CHANGE UP (ref 10–40)
BASOPHILS # BLD AUTO: 0.03 K/UL — SIGNIFICANT CHANGE UP (ref 0–0.2)
BASOPHILS NFR BLD AUTO: 0.2 % — SIGNIFICANT CHANGE UP (ref 0–2)
BILIRUB SERPL-MCNC: 1 MG/DL — SIGNIFICANT CHANGE UP (ref 0.2–1.2)
BUN SERPL-MCNC: 26 MG/DL — HIGH (ref 7–23)
CALCIUM SERPL-MCNC: 9.7 MG/DL — SIGNIFICANT CHANGE UP (ref 8.4–10.5)
CHLORIDE SERPL-SCNC: 95 MMOL/L — LOW (ref 96–108)
CO2 SERPL-SCNC: 27 MMOL/L — SIGNIFICANT CHANGE UP (ref 22–31)
CREAT SERPL-MCNC: 0.52 MG/DL — SIGNIFICANT CHANGE UP (ref 0.5–1.3)
CRP SERPL-MCNC: 1 MG/DL — HIGH (ref 0–0.4)
D DIMER BLD IA.RAPID-MCNC: 245 NG/ML DDU — HIGH
EOSINOPHIL # BLD AUTO: 0.01 K/UL — SIGNIFICANT CHANGE UP (ref 0–0.5)
EOSINOPHIL NFR BLD AUTO: 0.1 % — SIGNIFICANT CHANGE UP (ref 0–6)
FERRITIN SERPL-MCNC: 863 NG/ML — HIGH (ref 15–150)
GLUCOSE BLDC GLUCOMTR-MCNC: 139 MG/DL — HIGH (ref 70–99)
GLUCOSE BLDC GLUCOMTR-MCNC: 141 MG/DL — HIGH (ref 70–99)
GLUCOSE BLDC GLUCOMTR-MCNC: 210 MG/DL — HIGH (ref 70–99)
GLUCOSE BLDC GLUCOMTR-MCNC: 245 MG/DL — HIGH (ref 70–99)
GLUCOSE SERPL-MCNC: 112 MG/DL — HIGH (ref 70–99)
HCT VFR BLD CALC: 44.4 % — SIGNIFICANT CHANGE UP (ref 34.5–45)
HGB BLD-MCNC: 14.1 G/DL — SIGNIFICANT CHANGE UP (ref 11.5–15.5)
IMM GRANULOCYTES NFR BLD AUTO: 1.7 % — HIGH (ref 0–1.5)
LYMPHOCYTES # BLD AUTO: 1.33 K/UL — SIGNIFICANT CHANGE UP (ref 1–3.3)
LYMPHOCYTES # BLD AUTO: 7.7 % — LOW (ref 13–44)
MAGNESIUM SERPL-MCNC: 2.2 MG/DL — SIGNIFICANT CHANGE UP (ref 1.6–2.6)
MCHC RBC-ENTMCNC: 28 PG — SIGNIFICANT CHANGE UP (ref 27–34)
MCHC RBC-ENTMCNC: 31.8 GM/DL — LOW (ref 32–36)
MCV RBC AUTO: 88.1 FL — SIGNIFICANT CHANGE UP (ref 80–100)
MONOCYTES # BLD AUTO: 0.68 K/UL — SIGNIFICANT CHANGE UP (ref 0–0.9)
MONOCYTES NFR BLD AUTO: 3.9 % — SIGNIFICANT CHANGE UP (ref 2–14)
NEUTROPHILS # BLD AUTO: 14.91 K/UL — HIGH (ref 1.8–7.4)
NEUTROPHILS NFR BLD AUTO: 86.4 % — HIGH (ref 43–77)
NRBC # BLD: 0 /100 WBCS — SIGNIFICANT CHANGE UP (ref 0–0)
PHOSPHATE SERPL-MCNC: 4.7 MG/DL — HIGH (ref 2.5–4.5)
PLATELET # BLD AUTO: 423 K/UL — HIGH (ref 150–400)
POTASSIUM SERPL-MCNC: 4.6 MMOL/L — SIGNIFICANT CHANGE UP (ref 3.5–5.3)
POTASSIUM SERPL-SCNC: 4.6 MMOL/L — SIGNIFICANT CHANGE UP (ref 3.5–5.3)
PROCALCITONIN SERPL-MCNC: 0.03 NG/ML — SIGNIFICANT CHANGE UP (ref 0.02–0.1)
PROT SERPL-MCNC: 7.3 G/DL — SIGNIFICANT CHANGE UP (ref 6–8.3)
RBC # BLD: 5.04 M/UL — SIGNIFICANT CHANGE UP (ref 3.8–5.2)
RBC # FLD: 13.5 % — SIGNIFICANT CHANGE UP (ref 10.3–14.5)
SODIUM SERPL-SCNC: 137 MMOL/L — SIGNIFICANT CHANGE UP (ref 135–145)
WBC # BLD: 17.26 K/UL — HIGH (ref 3.8–10.5)
WBC # FLD AUTO: 17.26 K/UL — HIGH (ref 3.8–10.5)

## 2020-04-12 PROCEDURE — 71045 X-RAY EXAM CHEST 1 VIEW: CPT | Mod: 26

## 2020-04-12 PROCEDURE — 99232 SBSQ HOSP IP/OBS MODERATE 35: CPT

## 2020-04-12 RX ADMIN — AMLODIPINE BESYLATE 5 MILLIGRAM(S): 2.5 TABLET ORAL at 05:02

## 2020-04-12 RX ADMIN — ALBUTEROL 2 PUFF(S): 90 AEROSOL, METERED ORAL at 22:56

## 2020-04-12 RX ADMIN — ALBUTEROL 2 PUFF(S): 90 AEROSOL, METERED ORAL at 05:02

## 2020-04-12 RX ADMIN — FAMOTIDINE 20 MILLIGRAM(S): 10 INJECTION INTRAVENOUS at 12:34

## 2020-04-12 RX ADMIN — BUDESONIDE AND FORMOTEROL FUMARATE DIHYDRATE 2 PUFF(S): 160; 4.5 AEROSOL RESPIRATORY (INHALATION) at 22:56

## 2020-04-12 RX ADMIN — ALBUTEROL 2 PUFF(S): 90 AEROSOL, METERED ORAL at 16:39

## 2020-04-12 RX ADMIN — Medication 40 MILLIGRAM(S): at 05:02

## 2020-04-12 RX ADMIN — Medication 2: at 17:06

## 2020-04-12 RX ADMIN — BUDESONIDE AND FORMOTEROL FUMARATE DIHYDRATE 2 PUFF(S): 160; 4.5 AEROSOL RESPIRATORY (INHALATION) at 10:18

## 2020-04-12 RX ADMIN — ATORVASTATIN CALCIUM 20 MILLIGRAM(S): 80 TABLET, FILM COATED ORAL at 22:56

## 2020-04-12 RX ADMIN — Medication 3 MILLIGRAM(S): at 22:56

## 2020-04-12 RX ADMIN — ALBUTEROL 2 PUFF(S): 90 AEROSOL, METERED ORAL at 10:19

## 2020-04-12 RX ADMIN — ENOXAPARIN SODIUM 40 MILLIGRAM(S): 100 INJECTION SUBCUTANEOUS at 22:56

## 2020-04-12 RX ADMIN — CEFTRIAXONE 100 MILLIGRAM(S): 500 INJECTION, POWDER, FOR SOLUTION INTRAMUSCULAR; INTRAVENOUS at 14:40

## 2020-04-12 RX ADMIN — SERTRALINE 25 MILLIGRAM(S): 25 TABLET, FILM COATED ORAL at 12:35

## 2020-04-12 RX ADMIN — Medication 2: at 12:34

## 2020-04-12 NOTE — PROGRESS NOTE ADULT - ASSESSMENT
74 F former smoker, PMH asthma (never intubated, ran out of albuterol), HTN, HLD, DM p/w 5 days SOB/ cough, admitted for hypoxia 2/2 COVID (+). She is intermittently anxious reporting insomnia; xanax is being given PRN. Attempts to wean off of the NRB have been unsuccessful and her COVID labs are trending up; given this, steroids were started on 4/7; Tocilizumab also started 4/7. She has now weaned to NC 3L, improving CRP, white count improving.

## 2020-04-12 NOTE — PROGRESS NOTE ADULT - ATTENDING COMMENTS
pt seen and examined.  clinical details reviewed.    O2 requirements gradually improving.  steroids/tocilizumab continues  tracking wbc's/urine cx  follow CRP/DDimer
patient evaluated   clinical data reviewed in detail  on steroid and tocilizumab  O2 requirement are improving significantly  CRP improving; WBC inc - likely due to steroids  track COVID 19 biomarkers  continue to wean O2
patient seen and evaluated.  clinical data reviewed in detail  O2 requirements increasing with adversely rising CRP and D dimer  institute iv steroid and tocilizumab  trend COVID 19 markers  close monitoring of O2 requirements and respiratory status
patient evaluated  clinical data reviewed  O2 requirements overnight increased - weaning again today  chest xray - deteriorating  COVID 19 biomarkers are improving  consider diuretic
patient evaluated  clinical data reviewed in detail  O2 wean continues  furosemide 40 mg iv given yesterday - good response  wbc trend improving   will address urine cx results  consider resuming her home meds
patient evaluated  clinical data reviewed  O2 wean continues  check ECG - bradycardia  chest xray  antibiotics continues for UTI  check procalcitonin (inc wbc)

## 2020-04-12 NOTE — PROGRESS NOTE ADULT - PROBLEM SELECTOR PROBLEM 2
Asthma
Asthma
Urinary tract infection without hematuria, site unspecified
Leukocytosis, unspecified type
Asthma
Asthma
Leukocytosis, unspecified type

## 2020-04-12 NOTE — PROGRESS NOTE ADULT - PROBLEM SELECTOR PLAN 3
-never intubated, mild intermittent but also on montelukast  -albuterol q6 standing  -Symbicort  bid  -Started steroids 4/7/2020  -consider restarting monteluklast urine cx positive strep gallolyticus, rocephin started 4/11  trending up, chest xray and procalcitonin pending

## 2020-04-12 NOTE — PROGRESS NOTE ADULT - NSHPATTENDINGPLANDISCUSS_GEN_ALL_CORE
Lakesha Washington
Lakesha Washington
Tran Pickering NP
Cristina Moran, NP
Ari URBAN
Tran Pickering, NP

## 2020-04-12 NOTE — PROGRESS NOTE ADULT - PROBLEM SELECTOR PLAN 6
-hold home crestor given lfts elevated  -DASH/TLC diet -ISS  -Monitor BGs per routine, consider endo consult   -hold home januvia

## 2020-04-12 NOTE — PROGRESS NOTE ADULT - PROBLEM SELECTOR PLAN 5
-ISS  -Monitor BGs per routine, consider endo consult   -hold home januvia -C/w home zoloft, prn xanax  -will increase melatonin for sleep to 6mg

## 2020-04-12 NOTE — PROGRESS NOTE ADULT - PROBLEM SELECTOR PLAN 9
-Continue famotidine 20mg PO daily  -PRN Maalox   -Encourage sitting up on the edge of the bed for meals F: PO hydration  E: replete PRN  N: dash/ CC diet  DVT ppx: lovenox  other ppx famotidine  FULL CODE  dispo: RMF  Ambulate as tolerated

## 2020-04-12 NOTE — PROGRESS NOTE ADULT - SUBJECTIVE AND OBJECTIVE BOX
Internal Medicine ACP COVID Adult Progress Note    CC: 74 F former smoker, PMH asthma (never intubated, ran out of albuterol), HTN, HLD, DM p/w 5 days of SOB. Pt w/ 5 days of SOB, cough, improved w/ albuterol but ran out of albuterol day before admission. No fevers, endorses chills, no nv/d. No sick contacts but daughter is nurse and pt went to [facility] in jan. Last asthma attack in dec, never got steroids or ER visit, just montelukast    Subjective Assessment/Interval HPI: Denies overnight events, slept well, good breakfast, denies SOB wheeze, denies abd pain, n/v/d. Acknowledges improving non-productive cough. Ambulating well independently to bathroom.    	  MEDICATIONS:  amLODIPine   Tablet 5 milliGRAM(s) Oral daily  ALBUTerol    90 MICROgram(s) HFA Inhaler 2 Puff(s) Inhalation every 6 hours  budesonide 160 MICROgram(s)/formoterol 4.5 MICROgram(s) Inhaler 2 Puff(s) Inhalation two times a day  guaifenesin/dextromethorphan  Syrup 5 milliLiter(s) Oral every 8 hours PRN  acetaminophen   Tablet .. 650 milliGRAM(s) Oral every 6 hours PRN  ALPRAZolam 0.25 milliGRAM(s) Oral at bedtime PRN  melatonin 3 milliGRAM(s) Oral at bedtime  sertraline 25 milliGRAM(s) Oral daily  aluminum hydroxide/magnesium hydroxide/simethicone Suspension 30 milliLiter(s) Oral every 6 hours PRN  famotidine    Tablet 20 milliGRAM(s) Oral daily  dextrose 40% Gel 15 Gram(s) Oral once PRN  dextrose 50% Injectable 12.5 Gram(s) IV Push once  dextrose 50% Injectable 25 Gram(s) IV Push once  dextrose 50% Injectable 25 Gram(s) IV Push once  glucagon  Injectable 1 milliGRAM(s) IntraMuscular once PRN  insulin lispro (HumaLOG) corrective regimen sliding scale   SubCutaneous Before meals and at bedtime  methylPREDNISolone sodium succinate Injectable 40 milliGRAM(s) IV Push every 12 hours  dextrose 5%. 1000 milliLiter(s) IV Continuous <Continuous>  enoxaparin Injectable 40 milliGRAM(s) SubCutaneous every 24 hours      	  TELEMETRY:     T(C): 36.6 (04-11-20 @ 05:36), Max: 36.6 (04-11-20 @ 05:36)  HR: 53 (04-11-20 @ 05:36) (53 - 58)  BP: 121/64 (04-11-20 @ 05:36) (110/61 - 123/63)  RR: 20 (04-11-20 @ 05:36) (18 - 24)  SpO2: 96% (04-11-20 @ 05:36) (96% - 100%)  Wt(kg): --  I&O's Summary        DIAGNOSTIC TESTING:    LABS:	 	        04-11    134<L>  |  91<L>  |  22  ----------------------------<  228<H>  3.9   |  26  |  0.49<L>    Ca    9.8      11 Apr 2020 10:06  Phos  4.0     04-11  Mg     2.1     04-11    TPro  7.0  /  Alb  3.6  /  TBili  1.2  /  DBili  x   /  AST  33  /  ALT  87<H>  /  AlkPhos  85  04-11                          14.3   15.03 )-----------( 456      ( 11 Apr 2020 10:06 )             45.4              COVID SPECIFIC LABS  Auto Neutrophil %: 86.1 % <H> [43.0 - 77.0] (04-11-20 @ 10:06)  D-Dimer Assay, Quantitative: 389 ng/mL DDU <H> (04-11-20 @ 10:06)  Auto Neutrophil %: 86.7 % <H> [43.0 - 77.0] (04-10-20 @ 06:55)  Aspartate Aminotransferase (AST/SGOT): 35 U/L [10 - 40] (04-10-20 @ 06:55)  Alanine Aminotransferase (ALT/SGPT): 88 U/L <H> [10 - 45] (04-10-20 @ 06:55)  Ferritin, Serum: 901 ng/mL <H> [15 - 150] (04-10-20 @ 06:55)  D-Dimer Assay, Quantitative: 364 ng/mL DDU <H> (04-10-20 @ 06:55)  C-Reactive Protein, Serum: 3.06 mg/dL <H> [0.00 - 0.40] (04-10-20 @ 06:55)  Aspartate Aminotransferase (AST/SGOT): 54 U/L <H> [10 - 40] (04-09-20 @ 12:59)  Alanine Aminotransferase (ALT/SGPT): 101 U/L <H> [10 - 45] (04-09-20 @ 12:59)  C-Reactive Protein, Serum: 5.61 mg/dL <H> [0.00 - 0.40] (04-09-20 @ 12:59)  Ferritin, Serum: 1045 ng/mL <H> [15 - 150] (04-09-20 @ 12:59)  Auto Neutrophil %: 94.8 % <H> [43.0 - 77.0] (04-09-20 @ 12:58)  D-Dimer Assay, Quantitative: 476 ng/mL DDU <H> (04-09-20 @ 12:58)  D-Dimer Assay, Quantitative: 556 ng/mL DDU <H> (04-08-20 @ 08:40)  C-Reactive Protein, Serum: 18.46 mg/dL <H> [0.00 - 0.40] (04-08-20 @ 08:40)  Auto Neutrophil %: 91.3 % <H> [43.0 - 77.0] (04-08-20 @ 08:40)  Aspartate Aminotransferase (AST/SGOT): 47 U/L <H> [10 - 40] (04-08-20 @ 08:40)  Alanine Aminotransferase (ALT/SGPT): 54 U/L <H> [10 - 45] (04-08-20 @ 08:40)  Ferritin, Serum: 1071 ng/mL <H> [15 - 150] (04-08-20 @ 08:40)      PHYSICAL EXAM:      Constitutional:    Eyes: Non-icteric, EOMI, PERRL    Neck: supple nontender, trachea midline      Respiratory: Non labored, no accessory muscle use    Cardiovascular: Episodic bradycardia, no peripheral edema, 2+ pulses radial, pedal    Gastrointestinal: Soft, nontender    Genitourinary: voiding spontanesouly        Neurological: A/OX3, AHN spontaneously antigravity      Skin Warm, dry, intact Internal Medicine ACP COVID Adult Progress Note    CC: 74 F former smoker, PMH asthma (never intubated, ran out of albuterol), HTN, HLD, DM p/w 5 days of SOB. Pt w/ 5 days of SOB, cough, improved w/ albuterol but ran out of albuterol day before admission. No fevers, endorses chills, no nv/d. No sick contacts but daughter is nurse and pt went to [facility] in jan. Last asthma attack in dec, never got steroids or ER visit, just montelukast    Subjective Assessment/Interval HPI: Denies overnight events, slept well, good breakfast, denies SOB wheeze, denies abd pain, n/v/d. Acknowledges improving non-productive cough. Ambulating well independently to bathroom.    	  MEDICATIONS:  amLODIPine   Tablet 5 milliGRAM(s) Oral daily  ALBUTerol    90 MICROgram(s) HFA Inhaler 2 Puff(s) Inhalation every 6 hours  budesonide 160 MICROgram(s)/formoterol 4.5 MICROgram(s) Inhaler 2 Puff(s) Inhalation two times a day  guaifenesin/dextromethorphan  Syrup 5 milliLiter(s) Oral every 8 hours PRN  acetaminophen   Tablet .. 650 milliGRAM(s) Oral every 6 hours PRN  ALPRAZolam 0.25 milliGRAM(s) Oral at bedtime PRN  melatonin 3 milliGRAM(s) Oral at bedtime  sertraline 25 milliGRAM(s) Oral daily  aluminum hydroxide/magnesium hydroxide/simethicone Suspension 30 milliLiter(s) Oral every 6 hours PRN  famotidine    Tablet 20 milliGRAM(s) Oral daily  dextrose 40% Gel 15 Gram(s) Oral once PRN  dextrose 50% Injectable 12.5 Gram(s) IV Push once  dextrose 50% Injectable 25 Gram(s) IV Push once  dextrose 50% Injectable 25 Gram(s) IV Push once  glucagon  Injectable 1 milliGRAM(s) IntraMuscular once PRN  insulin lispro (HumaLOG) corrective regimen sliding scale   SubCutaneous Before meals and at bedtime  methylPREDNISolone sodium succinate Injectable 40 milliGRAM(s) IV Push every 12 hours  dextrose 5%. 1000 milliLiter(s) IV Continuous <Continuous>  enoxaparin Injectable 40 milliGRAM(s) SubCutaneous every 24 hours      Vital Signs Last 24 Hrs  T(C): 36.3 (11 Apr 2020 22:25), Max: 36.3 (11 Apr 2020 22:25)  T(F): 97.4 (11 Apr 2020 22:25), Max: 97.4 (11 Apr 2020 22:25)  HR: 59 (11 Apr 2020 22:25) (59 - 59)  BP: 121/59 (11 Apr 2020 22:25) (100/57 - 121/59)  BP(mean): 83 (11 Apr 2020 22:25) (83 - 83)  RR: 20 (11 Apr 2020 22:25) (20 - 22)  SpO2: 97% (11 Apr 2020 22:25) (95% - 98%)        DIAGNOSTIC TESTING:    LABS:	 	       04-12    137  |  95<L>  |  26<H>  ----------------------------<  112<H>  4.6   |  27  |  0.52    Ca    9.7      12 Apr 2020 09:27  Phos  4.7     04-12  Mg     2.2     04-12    TPro  7.3  /  Alb  3.7  /  TBili  1.0  /  DBili  x   /  AST  30  /  ALT  90<H>  /  AlkPhos  89  04-12                          14.1   17.26 )-----------( 423      ( 12 Apr 2020 09:27 )             44.4     C-Reactive Protein, Serum: 1.00 (04-12)  C-Reactive Protein, Serum: 1.57 (04-11)  C-Reactive Protein, Serum: 3.06 (04-10)  C-Reactive Protein, Serum: 5.61 (04-09)  C-Reactive Protein, Serum: 18.46 (04-08)  C-Reactive Protein, Serum: 20.71 (04-07)  C-Reactive Protein, Serum: 11.19 (04-05)    Ferritin, Serum: 863 (04-12)  Ferritin, Serum: 958 (04-11)  Ferritin, Serum: 901 (04-10)  Ferritin, Serum: 1045 (04-09)  Ferritin, Serum: 1071 (04-08)  Ferritin, Serum: 798 (04-07)  Ferritin, Serum: 744 (04-06)  Ferritin, Serum: 781 (04-05)      D-Dimer Assay, Quantitative: 245 (04-12)  D-Dimer Assay, Quantitative: 389 (04-11)  D-Dimer Assay, Quantitative: 364 (04-10)  D-Dimer Assay, Quantitative: 476 (04-09)  D-Dimer Assay, Quantitative: 556 (04-08)  D-Dimer Assay, Quantitative: 403 (04-07)  D-Dimer Assay, Quantitative: 297 (04-06)  D-Dimer Assay, Quantitative: 343 (04-05)    COVID-19 PCR: Detected (04-05-20 @ 14:21)              PHYSICAL EXAM:      Constitutional:    Eyes: Non-icteric, EOMI, PERRL    Neck: supple nontender, trachea midline      Respiratory: Non labored, no accessory muscle use    Cardiovascular: Episodic bradycardia, no peripheral edema, 2+ pulses radial, pedal    Gastrointestinal: Soft, nontender    Genitourinary: voiding spontaneously        Neurological: A/OX3, AHN spontaneously antigravity      Skin Warm, dry, intact

## 2020-04-12 NOTE — PROGRESS NOTE ADULT - PROBLEM SELECTOR PLAN 1
covid +, hypoxia requiring up to 15L NRB, now weaning, down to 3L NC  -isolation precautions  -tylenol q6 prn  -azithro completed 4/9  -plaquenil completed 4/9  -cough meds prn  -Steroids started 4/7  -toci 4/7  -continue IS

## 2020-04-12 NOTE — PROGRESS NOTE ADULT - PROBLEM SELECTOR PLAN 8
F: PO hydration  E: replete PRN  N: dash/ CC diet  DVT ppx: lovenox  other ppx famotidine  FULL CODE  dispo: RMF  Ambulate as tolerated -restarted home amlodipine 5mg

## 2020-04-12 NOTE — PROGRESS NOTE ADULT - PROBLEM SELECTOR PLAN 4
-C/w home zoloft, prn xanax  -will increase melatonin for sleep to 6mg -never intubated, mild intermittent but also on montelukast  -albuterol q6 standing  -Symbicort  bid  -Started steroids 4/7/2020  -consider restarting monteluklast

## 2020-04-12 NOTE — PROGRESS NOTE ADULT - PROBLEM SELECTOR PROBLEM 1
Pneumonia due to 2019 novel coronavirus

## 2020-04-13 VITALS
OXYGEN SATURATION: 98 % | SYSTOLIC BLOOD PRESSURE: 99 MMHG | RESPIRATION RATE: 20 BRPM | HEART RATE: 68 BPM | DIASTOLIC BLOOD PRESSURE: 48 MMHG

## 2020-04-13 LAB
ALBUMIN SERPL ELPH-MCNC: 3.4 G/DL — SIGNIFICANT CHANGE UP (ref 3.3–5)
ALP SERPL-CCNC: 75 U/L — SIGNIFICANT CHANGE UP (ref 40–120)
ALT FLD-CCNC: 72 U/L — HIGH (ref 10–45)
ANION GAP SERPL CALC-SCNC: 10 MMOL/L — SIGNIFICANT CHANGE UP (ref 5–17)
AST SERPL-CCNC: 21 U/L — SIGNIFICANT CHANGE UP (ref 10–40)
BASOPHILS # BLD AUTO: 0.03 K/UL — SIGNIFICANT CHANGE UP (ref 0–0.2)
BASOPHILS NFR BLD AUTO: 0.2 % — SIGNIFICANT CHANGE UP (ref 0–2)
BILIRUB SERPL-MCNC: 0.9 MG/DL — SIGNIFICANT CHANGE UP (ref 0.2–1.2)
BUN SERPL-MCNC: 17 MG/DL — SIGNIFICANT CHANGE UP (ref 7–23)
CALCIUM SERPL-MCNC: 9.1 MG/DL — SIGNIFICANT CHANGE UP (ref 8.4–10.5)
CHLORIDE SERPL-SCNC: 99 MMOL/L — SIGNIFICANT CHANGE UP (ref 96–108)
CO2 SERPL-SCNC: 30 MMOL/L — SIGNIFICANT CHANGE UP (ref 22–31)
CREAT SERPL-MCNC: 0.48 MG/DL — LOW (ref 0.5–1.3)
CRP SERPL-MCNC: 0.64 MG/DL — HIGH (ref 0–0.4)
D DIMER BLD IA.RAPID-MCNC: 204 NG/ML DDU — SIGNIFICANT CHANGE UP
EOSINOPHIL # BLD AUTO: 0.08 K/UL — SIGNIFICANT CHANGE UP (ref 0–0.5)
EOSINOPHIL NFR BLD AUTO: 0.6 % — SIGNIFICANT CHANGE UP (ref 0–6)
FERRITIN SERPL-MCNC: 778 NG/ML — HIGH (ref 15–150)
GLUCOSE BLDC GLUCOMTR-MCNC: 111 MG/DL — HIGH (ref 70–99)
GLUCOSE BLDC GLUCOMTR-MCNC: 113 MG/DL — HIGH (ref 70–99)
GLUCOSE BLDC GLUCOMTR-MCNC: 131 MG/DL — HIGH (ref 70–99)
GLUCOSE BLDC GLUCOMTR-MCNC: 140 MG/DL — HIGH (ref 70–99)
GLUCOSE SERPL-MCNC: 124 MG/DL — HIGH (ref 70–99)
HCT VFR BLD CALC: 40.8 % — SIGNIFICANT CHANGE UP (ref 34.5–45)
HGB BLD-MCNC: 12.9 G/DL — SIGNIFICANT CHANGE UP (ref 11.5–15.5)
IMM GRANULOCYTES NFR BLD AUTO: 1.8 % — HIGH (ref 0–1.5)
LYMPHOCYTES # BLD AUTO: 1.71 K/UL — SIGNIFICANT CHANGE UP (ref 1–3.3)
LYMPHOCYTES # BLD AUTO: 12.2 % — LOW (ref 13–44)
MCHC RBC-ENTMCNC: 28.1 PG — SIGNIFICANT CHANGE UP (ref 27–34)
MCHC RBC-ENTMCNC: 31.6 GM/DL — LOW (ref 32–36)
MCV RBC AUTO: 88.9 FL — SIGNIFICANT CHANGE UP (ref 80–100)
MONOCYTES # BLD AUTO: 0.82 K/UL — SIGNIFICANT CHANGE UP (ref 0–0.9)
MONOCYTES NFR BLD AUTO: 5.8 % — SIGNIFICANT CHANGE UP (ref 2–14)
NEUTROPHILS # BLD AUTO: 11.17 K/UL — HIGH (ref 1.8–7.4)
NEUTROPHILS NFR BLD AUTO: 79.4 % — HIGH (ref 43–77)
NRBC # BLD: 0 /100 WBCS — SIGNIFICANT CHANGE UP (ref 0–0)
PHOSPHATE SERPL-MCNC: 3.5 MG/DL — SIGNIFICANT CHANGE UP (ref 2.5–4.5)
PLATELET # BLD AUTO: 367 K/UL — SIGNIFICANT CHANGE UP (ref 150–400)
POTASSIUM SERPL-MCNC: 4.4 MMOL/L — SIGNIFICANT CHANGE UP (ref 3.5–5.3)
POTASSIUM SERPL-SCNC: 4.4 MMOL/L — SIGNIFICANT CHANGE UP (ref 3.5–5.3)
PROT SERPL-MCNC: 6.2 G/DL — SIGNIFICANT CHANGE UP (ref 6–8.3)
RBC # BLD: 4.59 M/UL — SIGNIFICANT CHANGE UP (ref 3.8–5.2)
RBC # FLD: 13.5 % — SIGNIFICANT CHANGE UP (ref 10.3–14.5)
SODIUM SERPL-SCNC: 139 MMOL/L — SIGNIFICANT CHANGE UP (ref 135–145)
WBC # BLD: 14.07 K/UL — HIGH (ref 3.8–10.5)
WBC # FLD AUTO: 14.07 K/UL — HIGH (ref 3.8–10.5)

## 2020-04-13 PROCEDURE — 99239 HOSP IP/OBS DSCHRG MGMT >30: CPT | Mod: GC

## 2020-04-13 RX ORDER — BUDESONIDE AND FORMOTEROL FUMARATE DIHYDRATE 160; 4.5 UG/1; UG/1
1 AEROSOL RESPIRATORY (INHALATION)
Qty: 1 | Refills: 0
Start: 2020-04-13 | End: 2020-04-17

## 2020-04-13 RX ORDER — AMLODIPINE BESYLATE 2.5 MG/1
1 TABLET ORAL
Qty: 5 | Refills: 0
Start: 2020-04-13 | End: 2020-04-17

## 2020-04-13 RX ORDER — CEFPODOXIME PROXETIL 100 MG
1 TABLET ORAL
Qty: 4 | Refills: 0
Start: 2020-04-13 | End: 2020-04-14

## 2020-04-13 RX ORDER — LOSARTAN POTASSIUM 100 MG/1
1 TABLET, FILM COATED ORAL
Qty: 5 | Refills: 0
Start: 2020-04-13 | End: 2020-04-17

## 2020-04-13 RX ORDER — FAMOTIDINE 10 MG/ML
1 INJECTION INTRAVENOUS
Qty: 5 | Refills: 0
Start: 2020-04-13 | End: 2020-04-17

## 2020-04-13 RX ORDER — SITAGLIPTIN 50 MG/1
1 TABLET, FILM COATED ORAL
Qty: 5 | Refills: 0
Start: 2020-04-13 | End: 2020-04-17

## 2020-04-13 RX ORDER — INSULIN LISPRO 100/ML
1 VIAL (ML) SUBCUTANEOUS
Qty: 100 | Refills: 0
Start: 2020-04-13 | End: 2020-04-17

## 2020-04-13 RX ORDER — ALBUTEROL 90 UG/1
2 AEROSOL, METERED ORAL
Qty: 1 | Refills: 0
Start: 2020-04-13 | End: 2020-04-17

## 2020-04-13 RX ORDER — ALPRAZOLAM 0.25 MG
1 TABLET ORAL
Qty: 5 | Refills: 0
Start: 2020-04-13 | End: 2020-04-17

## 2020-04-13 RX ORDER — ATORVASTATIN CALCIUM 80 MG/1
1 TABLET, FILM COATED ORAL
Qty: 5 | Refills: 0
Start: 2020-04-13 | End: 2020-04-17

## 2020-04-13 RX ORDER — SERTRALINE 25 MG/1
1 TABLET, FILM COATED ORAL
Qty: 5 | Refills: 0
Start: 2020-04-13 | End: 2020-04-17

## 2020-04-13 RX ADMIN — ALBUTEROL 2 PUFF(S): 90 AEROSOL, METERED ORAL at 17:00

## 2020-04-13 RX ADMIN — ALBUTEROL 2 PUFF(S): 90 AEROSOL, METERED ORAL at 05:02

## 2020-04-13 RX ADMIN — SERTRALINE 25 MILLIGRAM(S): 25 TABLET, FILM COATED ORAL at 14:45

## 2020-04-13 RX ADMIN — CEFTRIAXONE 100 MILLIGRAM(S): 500 INJECTION, POWDER, FOR SOLUTION INTRAMUSCULAR; INTRAVENOUS at 14:46

## 2020-04-13 RX ADMIN — ALBUTEROL 2 PUFF(S): 90 AEROSOL, METERED ORAL at 10:15

## 2020-04-13 RX ADMIN — AMLODIPINE BESYLATE 5 MILLIGRAM(S): 2.5 TABLET ORAL at 05:03

## 2020-04-13 RX ADMIN — FAMOTIDINE 20 MILLIGRAM(S): 10 INJECTION INTRAVENOUS at 14:45

## 2020-04-13 RX ADMIN — BUDESONIDE AND FORMOTEROL FUMARATE DIHYDRATE 2 PUFF(S): 160; 4.5 AEROSOL RESPIRATORY (INHALATION) at 10:15

## 2020-04-13 NOTE — DISCHARGE NOTE PROVIDER - NSDCMRMEDTOKEN_GEN_ALL_CORE_FT
albuterol 90 mcg/inh inhalation aerosol: 2 puff(s) inhaled every 6 hours  amLODIPine 5 mg oral tablet: 1 tab(s) orally once a day  atorvastatin 20 mg oral tablet: 1 tab(s) orally once a day (at bedtime)  budesonide-formoterol 160 mcg-4.5 mcg/inh inhalation aerosol: 1 puff(s) inhaled 2 times a day   cefpodoxime 200 mg oral tablet: 1 tab(s) orally 2 times a day   famotidine 20 mg oral tablet: 1 tab(s) orally once a day  Januvia 100 mg oral tablet: 1 tab(s) orally once a day  losartan 50 mg oral tablet: 1 tab(s) orally once a day  Xanax 0.5 mg oral tablet: 1 tab(s) orally once a day (at bedtime) MDD:2mg  Zoloft 25 mg oral tablet: 1 tab(s) orally once a day albuterol 90 mcg/inh inhalation aerosol: 2 puff(s) inhaled every 6 hours  amLODIPine 5 mg oral tablet: 1 tab(s) orally once a day  atorvastatin 20 mg oral tablet: 1 tab(s) orally once a day (at bedtime)  budesonide-formoterol 160 mcg-4.5 mcg/inh inhalation aerosol: 1 puff(s) inhaled 2 times a day   cefpodoxime 200 mg oral tablet: 1 tab(s) orally 2 times a day   famotidine 20 mg oral tablet: 1 tab(s) orally once a day  insulin lispro (concentrated) 200 units/mL subcutaneous solution: 1 unit(s) subcutaneous 3 times a day (before meals)   Januvia 100 mg oral tablet: 1 tab(s) orally once a day  losartan 50 mg oral tablet: 1 tab(s) orally once a day  Xanax 0.5 mg oral tablet: 1 tab(s) orally once a day (at bedtime) MDD:2mg  Zoloft 25 mg oral tablet: 1 tab(s) orally once a day

## 2020-04-13 NOTE — DISCHARGE NOTE PROVIDER - CARE PROVIDER_API CALL
Cesar Cardoza)  Med  Hosp Hospitalist  [address] [place] [address]  Phone: [phone]  Fax: [phone]  Follow Up Time:

## 2020-04-13 NOTE — DISCHARGE NOTE NURSING/CASE MANAGEMENT/SOCIAL WORK - PATIENT PORTAL LINK FT
You can access the FollowMyHealth Patient Portal offered by [facility] by registering at the following website: [email or web address]. By joining [facility]’s FollowMyHealth portal, you will also be able to view your health information using other applications (apps) compatible with our system.

## 2020-04-13 NOTE — DISCHARGE NOTE PROVIDER - HOSPITAL COURSE
74 F former smoker, PMH asthma (never intubated, ran out of albuterol), HTN, HLD, DM p/w 5 days SOB/ cough, admitted for hypoxia 2/2 COVID (+). She is intermittently anxious reporting insomnia; xanax is being given PRN. Attempts to wean off of the NRB have been unsuccessful and her COVID labs are trending up; given this, steroids were started on 4/7; Tocilizumab also started 4/7. She has now weaned to NC 3L, improving CRP, white count improving. Pt will be transferred to [facility] for further weaning of supplemental O2.

## 2020-04-13 NOTE — DISCHARGE NOTE PROVIDER - NSDCCPCAREPLAN_GEN_ALL_CORE_FT
PRINCIPAL DISCHARGE DIAGNOSIS  Diagnosis: Pneumonia due to COVID-19 virus  Assessment and Plan of Treatment: You presented to [facility] with symptoms concerning for COVID-19 infection given current pandemic. You were tested positive and started on treatment with Plaquanil and Azithromycin for 5 days. You were also on supplemental oxygen, now with decreased requirements. Your inflamatory markers improved. You will be transferred to [facility] fot further weaning of supplemental oxygen. You were also treated for possible bacterial pneumonia. Please continue Cefpodoxine 200mg twice daily for 2 more days. Please continue inhalers as directed.

## 2020-04-18 DIAGNOSIS — E11.9 TYPE 2 DIABETES MELLITUS WITHOUT COMPLICATIONS: ICD-10-CM

## 2020-04-18 DIAGNOSIS — E78.00 PURE HYPERCHOLESTEROLEMIA, UNSPECIFIED: ICD-10-CM

## 2020-04-18 DIAGNOSIS — N39.0 URINARY TRACT INFECTION, SITE NOT SPECIFIED: ICD-10-CM

## 2020-04-18 DIAGNOSIS — J12.89 OTHER VIRAL PNEUMONIA: ICD-10-CM

## 2020-04-18 DIAGNOSIS — R09.02 HYPOXEMIA: ICD-10-CM

## 2020-04-18 DIAGNOSIS — E78.5 HYPERLIPIDEMIA, UNSPECIFIED: ICD-10-CM

## 2020-04-18 DIAGNOSIS — R10.13 EPIGASTRIC PAIN: ICD-10-CM

## 2020-04-18 DIAGNOSIS — F41.9 ANXIETY DISORDER, UNSPECIFIED: ICD-10-CM

## 2020-04-18 DIAGNOSIS — Z79.84 LONG TERM (CURRENT) USE OF ORAL HYPOGLYCEMIC DRUGS: ICD-10-CM

## 2020-04-18 DIAGNOSIS — U07.1 COVID-19: ICD-10-CM

## 2020-04-18 DIAGNOSIS — B95.4 OTHER STREPTOCOCCUS AS THE CAUSE OF DISEASES CLASSIFIED ELSEWHERE: ICD-10-CM

## 2020-04-18 DIAGNOSIS — I10 ESSENTIAL (PRIMARY) HYPERTENSION: ICD-10-CM

## 2020-04-18 DIAGNOSIS — J45.20 MILD INTERMITTENT ASTHMA, UNCOMPLICATED: ICD-10-CM

## 2020-04-18 DIAGNOSIS — Z87.891 PERSONAL HISTORY OF NICOTINE DEPENDENCE: ICD-10-CM

## 2021-05-11 PROBLEM — Z00.00 ENCOUNTER FOR PREVENTIVE HEALTH EXAMINATION: Status: ACTIVE | Noted: 2021-05-11

## 2022-10-26 NOTE — PROGRESS NOTE ADULT - PROBLEM SELECTOR PROBLEM 6
Essential hypertension V-Y Flap Text: The defect edges were debeveled with a #15 scalpel blade.  Given the location of the defect, shape of the defect and the proximity to free margins a V-Y flap was deemed most appropriate.  Using a sterile surgical marker, an appropriate advancement flap was drawn incorporating the defect and placing the expected incisions within the relaxed skin tension lines where possible.    The area thus outlined was incised deep to adipose tissue with a #15 scalpel blade.  The skin margins were undermined to an appropriate distance in all directions utilizing iris scissors.

## 2023-11-14 RX ORDER — ZOLPIDEM TARTRATE 10 MG/1
1 TABLET ORAL
Qty: 0 | Refills: 0 | DISCHARGE

## 2023-11-14 RX ORDER — ROSUVASTATIN CALCIUM 5 MG/1
1 TABLET ORAL
Qty: 0 | Refills: 0 | DISCHARGE

## 2023-11-14 RX ORDER — AMLODIPINE BESYLATE 2.5 MG/1
1 TABLET ORAL
Qty: 0 | Refills: 0 | DISCHARGE

## 2023-11-14 RX ORDER — LOSARTAN POTASSIUM 100 MG/1
1 TABLET, FILM COATED ORAL
Qty: 0 | Refills: 0 | DISCHARGE

## 2023-11-14 RX ORDER — OMEPRAZOLE 10 MG/1
1 CAPSULE, DELAYED RELEASE ORAL
Qty: 0 | Refills: 0 | DISCHARGE

## 2023-11-14 RX ORDER — ALPRAZOLAM 0.25 MG
0 TABLET ORAL
Qty: 0 | Refills: 0 | DISCHARGE

## 2023-11-14 RX ORDER — SITAGLIPTIN 50 MG/1
1 TABLET, FILM COATED ORAL
Qty: 0 | Refills: 0 | DISCHARGE

## 2023-11-14 RX ORDER — SERTRALINE 25 MG/1
1 TABLET, FILM COATED ORAL
Qty: 0 | Refills: 0 | DISCHARGE